# Patient Record
Sex: MALE | URBAN - METROPOLITAN AREA
[De-identification: names, ages, dates, MRNs, and addresses within clinical notes are randomized per-mention and may not be internally consistent; named-entity substitution may affect disease eponyms.]

---

## 2023-04-07 ENCOUNTER — APPOINTMENT (OUTPATIENT)
Dept: LAB | Facility: LAB | Age: 58
End: 2023-04-07
Payer: COMMERCIAL

## 2023-04-07 LAB
ALANINE AMINOTRANSFERASE (SGPT) (U/L) IN SER/PLAS: 33 U/L (ref 10–52)
ALBUMIN (G/DL) IN SER/PLAS: 4 G/DL (ref 3.4–5)
ALKALINE PHOSPHATASE (U/L) IN SER/PLAS: 50 U/L (ref 33–120)
AMORPHOUS CRYSTALS, URINE: NORMAL /HPF
ANION GAP IN SER/PLAS: 12 MMOL/L (ref 10–20)
ANTI-CENTROMERE: <0.2 AI
ANTI-CHROMATIN: <0.2 AI
ANTI-DNA (DS): <1 IU/ML
ANTI-JO-1 IGG: <0.2 AI
ANTI-RIBOSOMAL P: <0.2 AI
ANTI-RNP: <0.2 AI
ANTI-SCL-70: <0.2 AI
ANTI-SM/RNP: <0.2 AI
ANTI-SM: <0.2 AI
ANTI-SSA: <0.2 AI
ANTI-SSB: <0.2 AI
ASPARTATE AMINOTRANSFERASE (SGOT) (U/L) IN SER/PLAS: 23 U/L (ref 9–39)
BACTERIA, URINE: NORMAL /HPF
BASOPHILS (10*3/UL) IN BLOOD BY AUTOMATED COUNT: 0.03 X10E9/L (ref 0–0.1)
BASOPHILS/100 LEUKOCYTES IN BLOOD BY AUTOMATED COUNT: 0.3 % (ref 0–2)
BILIRUBIN TOTAL (MG/DL) IN SER/PLAS: 0.4 MG/DL (ref 0–1.2)
BROAD CASTS, URINE: NORMAL /LPF
BUDDING YEAST, URINE: NORMAL /HPF
CALCIUM (MG/DL) IN SER/PLAS: 8.9 MG/DL (ref 8.6–10.6)
CALCIUM CARBONATE CRYSTALS, URINE: NORMAL /HPF
CALCIUM OXALATE CRYSTALS, URINE: NORMAL /HPF
CALCIUM PHOSPHATE CRYSTALS, URINE: NORMAL /HPF
CARBON DIOXIDE, TOTAL (MMOL/L) IN SER/PLAS: 27 MMOL/L (ref 21–32)
CHLORIDE (MMOL/L) IN SER/PLAS: 103 MMOL/L (ref 98–107)
CREATININE (MG/DL) IN SER/PLAS: 0.83 MG/DL (ref 0.5–1.3)
CYSTINE CRYSTALS, URINE: NORMAL /HPF
EOSINOPHILS (10*3/UL) IN BLOOD BY AUTOMATED COUNT: 0 X10E9/L (ref 0–0.7)
EOSINOPHILS/100 LEUKOCYTES IN BLOOD BY AUTOMATED COUNT: 0 % (ref 0–6)
EPITHELIAL CASTS, URINE: NORMAL /LPF
ERYTHROCYTE DISTRIBUTION WIDTH (RATIO) BY AUTOMATED COUNT: 14.3 % (ref 11.5–14.5)
ERYTHROCYTE MEAN CORPUSCULAR HEMOGLOBIN CONCENTRATION (G/DL) BY AUTOMATED: 33.1 G/DL (ref 32–36)
ERYTHROCYTE MEAN CORPUSCULAR VOLUME (FL) BY AUTOMATED COUNT: 96 FL (ref 80–100)
ERYTHROCYTES (10*6/UL) IN BLOOD BY AUTOMATED COUNT: 4.39 X10E12/L (ref 4.5–5.9)
FAT, URINE: NORMAL /HPF
FATTY CASTS, URINE: NORMAL /LPF
FINE GRANULAR CASTS, URINE: NORMAL /LPF
GFR MALE: >90 ML/MIN/1.73M2
GLUCOSE (MG/DL) IN SER/PLAS: 139 MG/DL (ref 74–99)
HEMATOCRIT (%) IN BLOOD BY AUTOMATED COUNT: 42.3 % (ref 41–52)
HEMOGLOBIN (G/DL) IN BLOOD: 14 G/DL (ref 13.5–17.5)
HYALINE CASTS, URINE: NORMAL /LPF
IMMATURE GRANULOCYTES/100 LEUKOCYTES IN BLOOD BY AUTOMATED COUNT: 2.5 % (ref 0–0.9)
LEUCINE CRYSTALS, URINE: NORMAL /HPF
LEUKOCYTES (10*3/UL) IN BLOOD BY AUTOMATED COUNT: 11.9 X10E9/L (ref 4.4–11.3)
LYMPHOCYTES (10*3/UL) IN BLOOD BY AUTOMATED COUNT: 0.84 X10E9/L (ref 1.2–4.8)
LYMPHOCYTES/100 LEUKOCYTES IN BLOOD BY AUTOMATED COUNT: 7 % (ref 13–44)
MIXED CELLULAR CASTS, URINE: NORMAL /LPF
MONOCYTES (10*3/UL) IN BLOOD BY AUTOMATED COUNT: 0.39 X10E9/L (ref 0.1–1)
MONOCYTES/100 LEUKOCYTES IN BLOOD BY AUTOMATED COUNT: 3.3 % (ref 2–10)
MUCUS, URINE: NORMAL /LPF
NEUTROPHILS (10*3/UL) IN BLOOD BY AUTOMATED COUNT: 10.38 X10E9/L (ref 1.2–7.7)
NEUTROPHILS/100 LEUKOCYTES IN BLOOD BY AUTOMATED COUNT: 86.9 % (ref 40–80)
NRBC (PER 100 WBCS) BY AUTOMATED COUNT: 0 /100 WBC (ref 0–0)
OVAL FAT BODIES, URINE: NORMAL /HPF
PLATELETS (10*3/UL) IN BLOOD AUTOMATED COUNT: 359 X10E9/L (ref 150–450)
POTASSIUM (MMOL/L) IN SER/PLAS: 4.7 MMOL/L (ref 3.5–5.3)
PROTEIN TOTAL: 6.2 G/DL (ref 6.4–8.2)
RBC CASTS, URINE: NORMAL /LPF
RBC CLUMPS, URINE: NORMAL /HPF
RBC, URINE: NORMAL
RENAL EPITHELIAL CELLS, URINE: NORMAL /HPF
SODIUM (MMOL/L) IN SER/PLAS: 137 MMOL/L (ref 136–145)
SPERMATOZOA, URINE: NORMAL /HPF
SQUAMOUS EPITHELIAL CELLS, URINE: NORMAL /HPF
TRANSITIONAL EPITHELIAL CELLS, URINE: NORMAL /HPF
TRICHOMONAS, URINE: NORMAL /HPF
TRIPLE PHOSPHATE CRYSTALS, URINE: NORMAL /HPF
TYROSINE CRYSTALS, URINE: NORMAL /HPF
UREA NITROGEN (MG/DL) IN SER/PLAS: 17 MG/DL (ref 6–23)
URIC ACID (URATE) CRYSTALS, URINE: NORMAL /HPF
URINALYSIS MICROSCOPIC COMMENT: NORMAL
WAXY CASTS, URINE: NORMAL /LPF
WBC CASTS, URINE: NORMAL /LPF
WBC CLUMPS, URINE: NORMAL /HPF
WBC, URINE: NORMAL
YEAST HYPHAE, URINE: NORMAL /HPF

## 2024-01-18 ENCOUNTER — HOSPITAL ENCOUNTER (OUTPATIENT)
Facility: HOSPITAL | Age: 59
Setting detail: OBSERVATION
Discharge: NURSING, REHAB OR LONG-TERM CARE | End: 2024-01-28
Attending: STUDENT IN AN ORGANIZED HEALTH CARE EDUCATION/TRAINING PROGRAM | Admitting: INTERNAL MEDICINE
Payer: COMMERCIAL

## 2024-01-18 DIAGNOSIS — I10 PRIMARY HYPERTENSION: ICD-10-CM

## 2024-01-18 DIAGNOSIS — H04.129 DRY EYE: ICD-10-CM

## 2024-01-18 DIAGNOSIS — B86 SCABIES: ICD-10-CM

## 2024-01-18 DIAGNOSIS — L30.9 ECZEMA, UNSPECIFIED TYPE: Primary | ICD-10-CM

## 2024-01-18 DIAGNOSIS — L20.9 ATOPIC DERMATITIS AND RELATED CONDITION: ICD-10-CM

## 2024-01-18 DIAGNOSIS — M19.011 PRIMARY OSTEOARTHRITIS OF RIGHT SHOULDER: ICD-10-CM

## 2024-01-18 DIAGNOSIS — R62.7 FAILURE TO THRIVE IN ADULT: ICD-10-CM

## 2024-01-18 PROBLEM — S06.9XAA TRAUMATIC BRAIN INJURY (MULTI): Status: ACTIVE | Noted: 2024-01-18

## 2024-01-18 PROBLEM — K21.9 GERD (GASTROESOPHAGEAL REFLUX DISEASE): Status: ACTIVE | Noted: 2024-01-18

## 2024-01-18 PROBLEM — R68.89 GENERAL SYMPTOM: Status: ACTIVE | Noted: 2024-01-18

## 2024-01-18 PROBLEM — I65.29 CAROTID ATHEROSCLEROSIS: Status: ACTIVE | Noted: 2024-01-18

## 2024-01-18 PROBLEM — E78.5 HYPERLIPIDEMIA: Status: ACTIVE | Noted: 2024-01-18

## 2024-01-18 PROBLEM — J44.9 CHRONIC OBSTRUCTIVE PULMONARY DISEASE (MULTI): Status: ACTIVE | Noted: 2024-01-18

## 2024-01-18 PROBLEM — B00.52: Status: ACTIVE | Noted: 2017-12-13

## 2024-01-18 LAB
ALBUMIN SERPL BCP-MCNC: 4.5 G/DL (ref 3.4–5)
ALP SERPL-CCNC: 58 U/L (ref 33–136)
ALT SERPL W P-5'-P-CCNC: 21 U/L (ref 10–52)
ANION GAP SERPL CALC-SCNC: 12 MMOL/L (ref 10–20)
AST SERPL W P-5'-P-CCNC: 20 U/L (ref 9–39)
BASOPHILS # BLD AUTO: 0.09 X10*3/UL (ref 0–0.1)
BASOPHILS NFR BLD AUTO: 0.6 %
BILIRUB SERPL-MCNC: 0.5 MG/DL (ref 0–1.2)
BUN SERPL-MCNC: 17 MG/DL (ref 6–23)
CALCIUM SERPL-MCNC: 10.1 MG/DL (ref 8.6–10.3)
CHLORIDE SERPL-SCNC: 103 MMOL/L (ref 98–107)
CO2 SERPL-SCNC: 28 MMOL/L (ref 21–32)
CREAT SERPL-MCNC: 1.11 MG/DL (ref 0.5–1.3)
CRP SERPL-MCNC: 0.69 MG/DL
EGFRCR SERPLBLD CKD-EPI 2021: 76 ML/MIN/1.73M*2
EOSINOPHIL # BLD AUTO: 0.19 X10*3/UL (ref 0–0.7)
EOSINOPHIL NFR BLD AUTO: 1.4 %
ERYTHROCYTE [DISTWIDTH] IN BLOOD BY AUTOMATED COUNT: 12.7 % (ref 11.5–14.5)
ERYTHROCYTE [SEDIMENTATION RATE] IN BLOOD BY WESTERGREN METHOD: 10 MM/H (ref 0–20)
GLUCOSE SERPL-MCNC: 78 MG/DL (ref 74–99)
HCT VFR BLD AUTO: 46.5 % (ref 41–52)
HGB BLD-MCNC: 15.3 G/DL (ref 13.5–17.5)
IMM GRANULOCYTES # BLD AUTO: 0.08 X10*3/UL (ref 0–0.7)
IMM GRANULOCYTES NFR BLD AUTO: 0.6 % (ref 0–0.9)
LACTATE SERPL-SCNC: 1.3 MMOL/L (ref 0.4–2)
LYMPHOCYTES # BLD AUTO: 1.23 X10*3/UL (ref 1.2–4.8)
LYMPHOCYTES NFR BLD AUTO: 8.8 %
MCH RBC QN AUTO: 32.3 PG (ref 26–34)
MCHC RBC AUTO-ENTMCNC: 32.9 G/DL (ref 32–36)
MCV RBC AUTO: 98 FL (ref 80–100)
MONOCYTES # BLD AUTO: 1.23 X10*3/UL (ref 0.1–1)
MONOCYTES NFR BLD AUTO: 8.8 %
NEUTROPHILS # BLD AUTO: 11.17 X10*3/UL (ref 1.2–7.7)
NEUTROPHILS NFR BLD AUTO: 79.8 %
NRBC BLD-RTO: 0 /100 WBCS (ref 0–0)
PLATELET # BLD AUTO: 415 X10*3/UL (ref 150–450)
POTASSIUM SERPL-SCNC: 4.2 MMOL/L (ref 3.5–5.3)
PROT SERPL-MCNC: 7.3 G/DL (ref 6.4–8.2)
RBC # BLD AUTO: 4.74 X10*6/UL (ref 4.5–5.9)
SODIUM SERPL-SCNC: 139 MMOL/L (ref 136–145)
WBC # BLD AUTO: 14 X10*3/UL (ref 4.4–11.3)

## 2024-01-18 PROCEDURE — 86140 C-REACTIVE PROTEIN: CPT | Performed by: NURSE PRACTITIONER

## 2024-01-18 PROCEDURE — 83605 ASSAY OF LACTIC ACID: CPT | Performed by: NURSE PRACTITIONER

## 2024-01-18 PROCEDURE — 85025 COMPLETE CBC W/AUTO DIFF WBC: CPT | Performed by: NURSE PRACTITIONER

## 2024-01-18 PROCEDURE — G0378 HOSPITAL OBSERVATION PER HR: HCPCS

## 2024-01-18 PROCEDURE — 96374 THER/PROPH/DIAG INJ IV PUSH: CPT

## 2024-01-18 PROCEDURE — 2500000001 HC RX 250 WO HCPCS SELF ADMINISTERED DRUGS (ALT 637 FOR MEDICARE OP): Performed by: STUDENT IN AN ORGANIZED HEALTH CARE EDUCATION/TRAINING PROGRAM

## 2024-01-18 PROCEDURE — 36415 COLL VENOUS BLD VENIPUNCTURE: CPT | Performed by: NURSE PRACTITIONER

## 2024-01-18 PROCEDURE — 85652 RBC SED RATE AUTOMATED: CPT | Performed by: NURSE PRACTITIONER

## 2024-01-18 PROCEDURE — 99285 EMERGENCY DEPT VISIT HI MDM: CPT | Performed by: STUDENT IN AN ORGANIZED HEALTH CARE EDUCATION/TRAINING PROGRAM

## 2024-01-18 PROCEDURE — 2500000004 HC RX 250 GENERAL PHARMACY W/ HCPCS (ALT 636 FOR OP/ED): Performed by: STUDENT IN AN ORGANIZED HEALTH CARE EDUCATION/TRAINING PROGRAM

## 2024-01-18 PROCEDURE — 80053 COMPREHEN METABOLIC PANEL: CPT | Performed by: NURSE PRACTITIONER

## 2024-01-18 RX ORDER — MOMETASONE FUROATE AND FORMOTEROL FUMARATE DIHYDRATE 200; 5 UG/1; UG/1
2 AEROSOL RESPIRATORY (INHALATION)
COMMUNITY

## 2024-01-18 RX ORDER — FERROUS SULFATE, DRIED 160(50) MG
1 TABLET, EXTENDED RELEASE ORAL DAILY
COMMUNITY

## 2024-01-18 RX ORDER — PANTOPRAZOLE SODIUM 40 MG/1
20 TABLET, DELAYED RELEASE ORAL
COMMUNITY

## 2024-01-18 RX ORDER — LORATADINE 10 MG/1
10 TABLET ORAL DAILY
COMMUNITY

## 2024-01-18 RX ORDER — ALPRAZOLAM 0.5 MG/1
0.5 TABLET, EXTENDED RELEASE ORAL EVERY MORNING
COMMUNITY

## 2024-01-18 RX ORDER — METOPROLOL TARTRATE 1 MG/ML
5 INJECTION, SOLUTION INTRAVENOUS ONCE
Status: COMPLETED | OUTPATIENT
Start: 2024-01-19 | End: 2024-01-19

## 2024-01-18 RX ORDER — VALACYCLOVIR HYDROCHLORIDE 500 MG/1
500 TABLET, FILM COATED ORAL 2 TIMES DAILY
COMMUNITY
End: 2024-01-28 | Stop reason: HOSPADM

## 2024-01-18 RX ORDER — PREDNISOLONE ACETATE 10 MG/ML
1 SUSPENSION/ DROPS OPHTHALMIC 4 TIMES DAILY
COMMUNITY

## 2024-01-18 RX ORDER — ACETAMINOPHEN 325 MG/1
650 TABLET ORAL EVERY 4 HOURS PRN
Status: DISCONTINUED | OUTPATIENT
Start: 2024-01-18 | End: 2024-01-28 | Stop reason: HOSPADM

## 2024-01-18 RX ORDER — LEVALBUTEROL TARTRATE 45 UG/1
1-2 AEROSOL, METERED ORAL EVERY 6 HOURS PRN
COMMUNITY
End: 2024-01-28 | Stop reason: HOSPADM

## 2024-01-18 RX ORDER — DIPHENHYDRAMINE HYDROCHLORIDE 50 MG/ML
50 INJECTION INTRAMUSCULAR; INTRAVENOUS ONCE
Status: COMPLETED | OUTPATIENT
Start: 2024-01-18 | End: 2024-01-18

## 2024-01-18 RX ORDER — DIPHENHYDRAMINE HYDROCHLORIDE 50 MG/ML
50 INJECTION INTRAMUSCULAR; INTRAVENOUS ONCE
Status: COMPLETED | OUTPATIENT
Start: 2024-01-19 | End: 2024-01-19

## 2024-01-18 RX ORDER — PREDNISONE 20 MG/1
60 TABLET ORAL DAILY
Status: DISCONTINUED | OUTPATIENT
Start: 2024-01-18 | End: 2024-01-19

## 2024-01-18 RX ORDER — OXYCODONE AND ACETAMINOPHEN 5; 325 MG/1; MG/1
1 TABLET ORAL EVERY 8 HOURS PRN
COMMUNITY

## 2024-01-18 RX ORDER — LOSARTAN POTASSIUM 50 MG/1
100 TABLET ORAL DAILY
COMMUNITY

## 2024-01-18 RX ADMIN — PREDNISONE 60 MG: 20 TABLET ORAL at 19:25

## 2024-01-18 RX ADMIN — POLYVINYL ALCOHOL, POVIDONE 2 DROP: 14; 6 SOLUTION/ DROPS OPHTHALMIC at 19:25

## 2024-01-18 RX ADMIN — DIPHENHYDRAMINE HYDROCHLORIDE 50 MG: 50 INJECTION, SOLUTION INTRAMUSCULAR; INTRAVENOUS at 18:21

## 2024-01-18 ASSESSMENT — PAIN DESCRIPTION - DESCRIPTORS: DESCRIPTORS: ACHING

## 2024-01-18 ASSESSMENT — LIFESTYLE VARIABLES
REASON UNABLE TO ASSESS: NO
HAVE PEOPLE ANNOYED YOU BY CRITICIZING YOUR DRINKING: NO
EVER FELT BAD OR GUILTY ABOUT YOUR DRINKING: NO
EVER HAD A DRINK FIRST THING IN THE MORNING TO STEADY YOUR NERVES TO GET RID OF A HANGOVER: NO
HAVE YOU EVER FELT YOU SHOULD CUT DOWN ON YOUR DRINKING: NO

## 2024-01-18 ASSESSMENT — PAIN DESCRIPTION - LOCATION: LOCATION: GENERALIZED

## 2024-01-18 ASSESSMENT — PAIN SCALES - GENERAL
PAINLEVEL_OUTOF10: 8
PAINLEVEL_OUTOF10: 8

## 2024-01-18 ASSESSMENT — PAIN DESCRIPTION - PAIN TYPE: TYPE: ACUTE PAIN;CHRONIC PAIN

## 2024-01-18 ASSESSMENT — PAIN DESCRIPTION - FREQUENCY: FREQUENCY: CONSTANT/CONTINUOUS

## 2024-01-18 ASSESSMENT — PAIN - FUNCTIONAL ASSESSMENT: PAIN_FUNCTIONAL_ASSESSMENT: 0-10

## 2024-01-18 NOTE — ED NOTES
"The pt is Alert and oriented x3, resp easy and regular. The pt c/o body rash with weakness and stated inability to care for himself. The body rash has been going on for years and worsening recently. The states, \"I had a friend drive me up from [place], [place] for evaluation. Several years ago Bina transferred me up to [facility] system for the same. I feel Bina doesn't know how to treat skin issues.\"     Susan Shahid RN  01/18/24 1813    "

## 2024-01-18 NOTE — ED TRIAGE NOTES
Secondary to patient volumes and overcrowding, I performed a brief medical screening exam of the patient in triage, as the patient awaits space in the main ED.    History of Present Illness:  Raul uJles presents with   Chief Complaint   Patient presents with    Rash       Physical Exam:  General - In no acute distress  Respiratory - Breathing comfortably  Neurologic - Moving all extremities  Derm -erythematous rash with xerosis to face, hands, and feet.    Medical Decision Making:  Patient will require further evaluation in the main ED.    Initial diagnostic tests were ordered from triage.    The patient demonstrates understanding that this initial evaluation is a brief medical screening exam and the expectation is that they await for space in the main ED to be further evaluated.  The patient understands that, if they leave prior to further evaluation in the main ED after this initial evaluation in triage, they are doing so under their own accord knowing that their evaluation/work-up is not yet complete. The patient also understands that any preliminary diagnostic results, including abnormalities, may not be shared with them, if they choose to leave prior to further evaluation in the main ED.

## 2024-01-19 LAB
ANION GAP SERPL CALC-SCNC: 13 MMOL/L (ref 10–20)
APPEARANCE UR: CLEAR
BILIRUB UR STRIP.AUTO-MCNC: NEGATIVE MG/DL
BUN SERPL-MCNC: 20 MG/DL (ref 6–23)
CALCIUM SERPL-MCNC: 9.2 MG/DL (ref 8.6–10.3)
CHLORIDE SERPL-SCNC: 107 MMOL/L (ref 98–107)
CO2 SERPL-SCNC: 24 MMOL/L (ref 21–32)
COLOR UR: YELLOW
CREAT SERPL-MCNC: 0.75 MG/DL (ref 0.5–1.3)
EGFRCR SERPLBLD CKD-EPI 2021: >90 ML/MIN/1.73M*2
ERYTHROCYTE [DISTWIDTH] IN BLOOD BY AUTOMATED COUNT: 12.8 % (ref 11.5–14.5)
GLUCOSE SERPL-MCNC: 103 MG/DL (ref 74–99)
GLUCOSE UR STRIP.AUTO-MCNC: NEGATIVE MG/DL
HCT VFR BLD AUTO: 41.1 % (ref 41–52)
HGB BLD-MCNC: 13.4 G/DL (ref 13.5–17.5)
HOLD SPECIMEN: NORMAL
KETONES UR STRIP.AUTO-MCNC: NEGATIVE MG/DL
LEUKOCYTE ESTERASE UR QL STRIP.AUTO: NEGATIVE
MCH RBC QN AUTO: 32.3 PG (ref 26–34)
MCHC RBC AUTO-ENTMCNC: 32.6 G/DL (ref 32–36)
MCV RBC AUTO: 99 FL (ref 80–100)
NITRITE UR QL STRIP.AUTO: NEGATIVE
NRBC BLD-RTO: 0 /100 WBCS (ref 0–0)
PH UR STRIP.AUTO: 5 [PH]
PLATELET # BLD AUTO: 389 X10*3/UL (ref 150–450)
POTASSIUM SERPL-SCNC: 4.5 MMOL/L (ref 3.5–5.3)
PROT UR STRIP.AUTO-MCNC: NEGATIVE MG/DL
RBC # BLD AUTO: 4.15 X10*6/UL (ref 4.5–5.9)
RBC # UR STRIP.AUTO: NEGATIVE /UL
SODIUM SERPL-SCNC: 139 MMOL/L (ref 136–145)
SP GR UR STRIP.AUTO: 1.02
UROBILINOGEN UR STRIP.AUTO-MCNC: <2 MG/DL
WBC # BLD AUTO: 8 X10*3/UL (ref 4.4–11.3)

## 2024-01-19 PROCEDURE — 81003 URINALYSIS AUTO W/O SCOPE: CPT | Performed by: NURSE PRACTITIONER

## 2024-01-19 PROCEDURE — 2500000001 HC RX 250 WO HCPCS SELF ADMINISTERED DRUGS (ALT 637 FOR MEDICARE OP): Performed by: INTERNAL MEDICINE

## 2024-01-19 PROCEDURE — 2500000001 HC RX 250 WO HCPCS SELF ADMINISTERED DRUGS (ALT 637 FOR MEDICARE OP): Performed by: NURSE PRACTITIONER

## 2024-01-19 PROCEDURE — 2500000005 HC RX 250 GENERAL PHARMACY W/O HCPCS: Performed by: NURSE PRACTITIONER

## 2024-01-19 PROCEDURE — 85027 COMPLETE CBC AUTOMATED: CPT | Performed by: NURSE PRACTITIONER

## 2024-01-19 PROCEDURE — 96375 TX/PRO/DX INJ NEW DRUG ADDON: CPT

## 2024-01-19 PROCEDURE — 97165 OT EVAL LOW COMPLEX 30 MIN: CPT | Mod: GO

## 2024-01-19 PROCEDURE — 94640 AIRWAY INHALATION TREATMENT: CPT

## 2024-01-19 PROCEDURE — 36415 COLL VENOUS BLD VENIPUNCTURE: CPT | Performed by: INTERNAL MEDICINE

## 2024-01-19 PROCEDURE — 2500000001 HC RX 250 WO HCPCS SELF ADMINISTERED DRUGS (ALT 637 FOR MEDICARE OP): Performed by: STUDENT IN AN ORGANIZED HEALTH CARE EDUCATION/TRAINING PROGRAM

## 2024-01-19 PROCEDURE — 99222 1ST HOSP IP/OBS MODERATE 55: CPT | Performed by: NURSE PRACTITIONER

## 2024-01-19 PROCEDURE — 80048 BASIC METABOLIC PNL TOTAL CA: CPT | Performed by: NURSE PRACTITIONER

## 2024-01-19 PROCEDURE — 2500000002 HC RX 250 W HCPCS SELF ADMINISTERED DRUGS (ALT 637 FOR MEDICARE OP, ALT 636 FOR OP/ED): Performed by: NURSE PRACTITIONER

## 2024-01-19 PROCEDURE — 99223 1ST HOSP IP/OBS HIGH 75: CPT | Performed by: INTERNAL MEDICINE

## 2024-01-19 PROCEDURE — 87389 HIV-1 AG W/HIV-1&-2 AB AG IA: CPT | Mod: PARLAB | Performed by: INTERNAL MEDICINE

## 2024-01-19 PROCEDURE — 97162 PT EVAL MOD COMPLEX 30 MIN: CPT | Mod: GP

## 2024-01-19 PROCEDURE — 96376 TX/PRO/DX INJ SAME DRUG ADON: CPT

## 2024-01-19 PROCEDURE — G0378 HOSPITAL OBSERVATION PER HR: HCPCS

## 2024-01-19 PROCEDURE — 36415 COLL VENOUS BLD VENIPUNCTURE: CPT | Performed by: NURSE PRACTITIONER

## 2024-01-19 PROCEDURE — 2500000004 HC RX 250 GENERAL PHARMACY W/ HCPCS (ALT 636 FOR OP/ED): Performed by: STUDENT IN AN ORGANIZED HEALTH CARE EDUCATION/TRAINING PROGRAM

## 2024-01-19 PROCEDURE — 2500000004 HC RX 250 GENERAL PHARMACY W/ HCPCS (ALT 636 FOR OP/ED): Performed by: NURSE PRACTITIONER

## 2024-01-19 RX ORDER — DIPHENHYDRAMINE HYDROCHLORIDE 50 MG/ML
25 INJECTION INTRAMUSCULAR; INTRAVENOUS EVERY 6 HOURS PRN
Status: DISCONTINUED | OUTPATIENT
Start: 2024-01-19 | End: 2024-01-28 | Stop reason: HOSPADM

## 2024-01-19 RX ORDER — FERROUS SULFATE, DRIED 160(50) MG
1 TABLET, EXTENDED RELEASE ORAL DAILY
Status: DISCONTINUED | OUTPATIENT
Start: 2024-01-19 | End: 2024-01-28 | Stop reason: HOSPADM

## 2024-01-19 RX ORDER — OXYCODONE AND ACETAMINOPHEN 5; 325 MG/1; MG/1
1 TABLET ORAL EVERY 8 HOURS PRN
Status: DISCONTINUED | OUTPATIENT
Start: 2024-01-19 | End: 2024-01-28 | Stop reason: HOSPADM

## 2024-01-19 RX ORDER — PANTOPRAZOLE SODIUM 20 MG/1
20 TABLET, DELAYED RELEASE ORAL
Status: DISCONTINUED | OUTPATIENT
Start: 2024-01-19 | End: 2024-01-28 | Stop reason: HOSPADM

## 2024-01-19 RX ORDER — SODIUM CHLORIDE 9 MG/ML
50 INJECTION, SOLUTION INTRAVENOUS CONTINUOUS
Status: ACTIVE | OUTPATIENT
Start: 2024-01-19 | End: 2024-01-19

## 2024-01-19 RX ORDER — PERMETHRIN 50 MG/G
CREAM TOPICAL ONCE
Status: COMPLETED | OUTPATIENT
Start: 2024-01-19 | End: 2024-01-19

## 2024-01-19 RX ORDER — ALPRAZOLAM 0.25 MG/1
0.5 TABLET ORAL DAILY
Status: DISCONTINUED | OUTPATIENT
Start: 2024-01-19 | End: 2024-01-28 | Stop reason: HOSPADM

## 2024-01-19 RX ORDER — FLUTICASONE FUROATE AND VILANTEROL 200; 25 UG/1; UG/1
1 POWDER RESPIRATORY (INHALATION)
Status: DISCONTINUED | OUTPATIENT
Start: 2024-01-19 | End: 2024-01-28 | Stop reason: HOSPADM

## 2024-01-19 RX ORDER — PREDNISOLONE ACETATE 10 MG/ML
1 SUSPENSION/ DROPS OPHTHALMIC 4 TIMES DAILY
Status: DISCONTINUED | OUTPATIENT
Start: 2024-01-19 | End: 2024-01-28 | Stop reason: HOSPADM

## 2024-01-19 RX ORDER — PREDNISONE 20 MG/1
40 TABLET ORAL DAILY
Status: COMPLETED | OUTPATIENT
Start: 2024-01-20 | End: 2024-01-20

## 2024-01-19 RX ORDER — AMLODIPINE BESYLATE 5 MG/1
5 TABLET ORAL DAILY
Status: DISCONTINUED | OUTPATIENT
Start: 2024-01-19 | End: 2024-01-20

## 2024-01-19 RX ORDER — LORATADINE 10 MG/1
10 TABLET ORAL DAILY
Status: DISCONTINUED | OUTPATIENT
Start: 2024-01-19 | End: 2024-01-28 | Stop reason: HOSPADM

## 2024-01-19 RX ORDER — LOSARTAN POTASSIUM 50 MG/1
100 TABLET ORAL DAILY
Status: DISCONTINUED | OUTPATIENT
Start: 2024-01-19 | End: 2024-01-28 | Stop reason: HOSPADM

## 2024-01-19 RX ADMIN — Medication: at 17:27

## 2024-01-19 RX ADMIN — LOSARTAN POTASSIUM 100 MG: 50 TABLET, FILM COATED ORAL at 10:06

## 2024-01-19 RX ADMIN — Medication 1 TABLET: at 10:06

## 2024-01-19 RX ADMIN — PREDNISONE 60 MG: 20 TABLET ORAL at 10:07

## 2024-01-19 RX ADMIN — PREDNISOLONE ACETATE 1 DROP: 10 SUSPENSION/ DROPS OPHTHALMIC at 07:00

## 2024-01-19 RX ADMIN — METOPROLOL TARTRATE 5 MG: 5 INJECTION INTRAVENOUS at 00:17

## 2024-01-19 RX ADMIN — POLYVINYL ALCOHOL, POVIDONE 2 DROP: 14; 6 SOLUTION/ DROPS OPHTHALMIC at 10:06

## 2024-01-19 RX ADMIN — ACETAMINOPHEN 650 MG: 325 TABLET ORAL at 00:17

## 2024-01-19 RX ADMIN — LORATADINE 10 MG: 10 TABLET ORAL at 10:07

## 2024-01-19 RX ADMIN — PERMETHRIN: 50 CREAM TOPICAL at 17:27

## 2024-01-19 RX ADMIN — ALPRAZOLAM 0.5 MG: 0.25 TABLET ORAL at 10:07

## 2024-01-19 RX ADMIN — PREDNISOLONE ACETATE 1 DROP: 10 SUSPENSION/ DROPS OPHTHALMIC at 21:00

## 2024-01-19 RX ADMIN — SODIUM CHLORIDE 50 ML/HR: 9 INJECTION, SOLUTION INTRAVENOUS at 00:19

## 2024-01-19 RX ADMIN — OXYCODONE HYDROCHLORIDE AND ACETAMINOPHEN 1 TABLET: 5; 325 TABLET ORAL at 09:58

## 2024-01-19 RX ADMIN — OXYCODONE HYDROCHLORIDE AND ACETAMINOPHEN 1 TABLET: 5; 325 TABLET ORAL at 23:29

## 2024-01-19 RX ADMIN — ACETAMINOPHEN 650 MG: 325 TABLET ORAL at 06:14

## 2024-01-19 RX ADMIN — OXYCODONE HYDROCHLORIDE AND ACETAMINOPHEN 1 TABLET: 5; 325 TABLET ORAL at 00:17

## 2024-01-19 RX ADMIN — FLUTICASONE FUROATE AND VILANTEROL TRIFENATATE 1 PUFF: 200; 25 POWDER RESPIRATORY (INHALATION) at 07:02

## 2024-01-19 RX ADMIN — DIPHENHYDRAMINE HYDROCHLORIDE 50 MG: 50 INJECTION, SOLUTION INTRAMUSCULAR; INTRAVENOUS at 00:17

## 2024-01-19 RX ADMIN — PREDNISOLONE ACETATE 1 DROP: 10 SUSPENSION/ DROPS OPHTHALMIC at 17:27

## 2024-01-19 RX ADMIN — PANTOPRAZOLE SODIUM 20 MG: 20 TABLET, DELAYED RELEASE ORAL at 06:13

## 2024-01-19 RX ADMIN — POLYVINYL ALCOHOL, POVIDONE 2 DROP: 14; 6 SOLUTION/ DROPS OPHTHALMIC at 00:18

## 2024-01-19 SDOH — SOCIAL STABILITY: SOCIAL INSECURITY: DOES ANYONE TRY TO KEEP YOU FROM HAVING/CONTACTING OTHER FRIENDS OR DOING THINGS OUTSIDE YOUR HOME?: NO

## 2024-01-19 SDOH — SOCIAL STABILITY: SOCIAL INSECURITY: ABUSE: ADULT

## 2024-01-19 SDOH — SOCIAL STABILITY: SOCIAL INSECURITY: DO YOU FEEL UNSAFE GOING BACK TO THE PLACE WHERE YOU ARE LIVING?: NO

## 2024-01-19 SDOH — SOCIAL STABILITY: SOCIAL INSECURITY: ARE YOU OR HAVE YOU BEEN THREATENED OR ABUSED PHYSICALLY, EMOTIONALLY, OR SEXUALLY BY ANYONE?: NO

## 2024-01-19 SDOH — SOCIAL STABILITY: SOCIAL INSECURITY: HAS ANYONE EVER THREATENED TO HURT YOUR FAMILY OR YOUR PETS?: NO

## 2024-01-19 SDOH — SOCIAL STABILITY: SOCIAL INSECURITY: HAVE YOU HAD THOUGHTS OF HARMING ANYONE ELSE?: NO

## 2024-01-19 SDOH — SOCIAL STABILITY: SOCIAL INSECURITY: WERE YOU ABLE TO COMPLETE ALL THE BEHAVIORAL HEALTH SCREENINGS?: YES

## 2024-01-19 SDOH — SOCIAL STABILITY: SOCIAL INSECURITY: ARE THERE ANY APPARENT SIGNS OF INJURIES/BEHAVIORS THAT COULD BE RELATED TO ABUSE/NEGLECT?: NO

## 2024-01-19 SDOH — SOCIAL STABILITY: SOCIAL INSECURITY: DO YOU FEEL ANYONE HAS EXPLOITED OR TAKEN ADVANTAGE OF YOU FINANCIALLY OR OF YOUR PERSONAL PROPERTY?: NO

## 2024-01-19 ASSESSMENT — COGNITIVE AND FUNCTIONAL STATUS - GENERAL
MOVING TO AND FROM BED TO CHAIR: A LOT
EATING MEALS: A LITTLE
DAILY ACTIVITIY SCORE: 20
TOILETING: A LITTLE
HELP NEEDED FOR BATHING: A LITTLE
DRESSING REGULAR UPPER BODY CLOTHING: A LITTLE
DRESSING REGULAR UPPER BODY CLOTHING: A LOT
HELP NEEDED FOR BATHING: A LOT
WALKING IN HOSPITAL ROOM: TOTAL
PERSONAL GROOMING: A LITTLE
MOBILITY SCORE: 24
STANDING UP FROM CHAIR USING ARMS: A LOT
DRESSING REGULAR LOWER BODY CLOTHING: A LOT
MOVING FROM LYING ON BACK TO SITTING ON SIDE OF FLAT BED WITH BEDRAILS: A LITTLE
DRESSING REGULAR LOWER BODY CLOTHING: A LITTLE
CLIMB 3 TO 5 STEPS WITH RAILING: TOTAL
PATIENT BASELINE BEDBOUND: NO
MOBILITY SCORE: 12
DAILY ACTIVITIY SCORE: 15
TURNING FROM BACK TO SIDE WHILE IN FLAT BAD: A LITTLE
PERSONAL GROOMING: A LITTLE

## 2024-01-19 ASSESSMENT — ACTIVITIES OF DAILY LIVING (ADL)
JUDGMENT_ADEQUATE_SAFELY_COMPLETE_DAILY_ACTIVITIES: YES
BATHING: INDEPENDENT
WALKS IN HOME: INDEPENDENT
PATIENT'S MEMORY ADEQUATE TO SAFELY COMPLETE DAILY ACTIVITIES?: NO
TOILETING: INDEPENDENT
LACK_OF_TRANSPORTATION: PATIENT DECLINED
GROOMING: NEEDS ASSISTANCE
FEEDING YOURSELF: INDEPENDENT
HEARING - LEFT EAR: DIFFICULTY WITH NOISE
DRESSING YOURSELF: NEEDS ASSISTANCE
ADEQUATE_TO_COMPLETE_ADL: YES
HEARING - RIGHT EAR: DIFFICULTY WITH NOISE

## 2024-01-19 ASSESSMENT — PATIENT HEALTH QUESTIONNAIRE - PHQ9
SUM OF ALL RESPONSES TO PHQ9 QUESTIONS 1 & 2: 0
1. LITTLE INTEREST OR PLEASURE IN DOING THINGS: NOT AT ALL
2. FEELING DOWN, DEPRESSED OR HOPELESS: NOT AT ALL

## 2024-01-19 ASSESSMENT — PAIN SCALES - GENERAL
PAINLEVEL_OUTOF10: 0 - NO PAIN
PAINLEVEL_OUTOF10: 7
PAINLEVEL_OUTOF10: 8
PAINLEVEL_OUTOF10: 8
PAINLEVEL_OUTOF10: 0 - NO PAIN

## 2024-01-19 ASSESSMENT — LIFESTYLE VARIABLES
AUDIT-C TOTAL SCORE: 1
SKIP TO QUESTIONS 9-10: 1
HOW MANY STANDARD DRINKS CONTAINING ALCOHOL DO YOU HAVE ON A TYPICAL DAY: 1 OR 2
AUDIT-C TOTAL SCORE: 1
HOW OFTEN DO YOU HAVE A DRINK CONTAINING ALCOHOL: MONTHLY OR LESS
HOW OFTEN DO YOU HAVE 6 OR MORE DRINKS ON ONE OCCASION: NEVER

## 2024-01-19 ASSESSMENT — COLUMBIA-SUICIDE SEVERITY RATING SCALE - C-SSRS
6. HAVE YOU EVER DONE ANYTHING, STARTED TO DO ANYTHING, OR PREPARED TO DO ANYTHING TO END YOUR LIFE?: NO
1. IN THE PAST MONTH, HAVE YOU WISHED YOU WERE DEAD OR WISHED YOU COULD GO TO SLEEP AND NOT WAKE UP?: NO
2. HAVE YOU ACTUALLY HAD ANY THOUGHTS OF KILLING YOURSELF?: NO

## 2024-01-19 ASSESSMENT — PAIN - FUNCTIONAL ASSESSMENT
PAIN_FUNCTIONAL_ASSESSMENT: 0-10
PAIN_FUNCTIONAL_ASSESSMENT: 0-10

## 2024-01-19 NOTE — CARE PLAN
The patient's goals for the shift include      The clinical goals for the shift include to be less itchy    Over the shift, the patient did not make progress toward the following goals. Barriers to progression include . Recommendations to address these barriers include .

## 2024-01-19 NOTE — ED PROVIDER NOTES
CC: Rash     HPI:  Raul Jules is a 58 y.o. male with a past medical history of anxiety, TBI, COPD, eczema, GERD, HSV of the eye, hyperlipidemia, hypertension, presenting to the emergency department today due to a rash.  Patient states that this rash feels very similar to a rash that he has had several years prior.  He states that several years prior, he went to [facility] and was admitted for severe atopic dermatitis.  He states that he was started on several medications at that time and eventually was discharged home.  He has not had a recurrent episode of that in several years.  He does not recall with the medications.  Has never had to have them refilled.  He has lost contact with dermatology since.  He states that over the past several weeks he has been having worsening episodes of this rash.  He is gone to multiple doctors however states that they do not feel comfortable with dermatology.  He states that he presented here to see a dermatologist.  He also endorses blurry vision.  He does have a ulcer in his eye however he states that this is worse than his normal.  He was recently seen by ophthalmology who did a full exam and diagnosed him with dry eyes.    Limitations to History: none  Additional History provided by: N/A    External Records Reviewed:  Recent available ED and inpatient notes reviewed in EMR.    PMHx/PSHx:  Per HPI.   - has no past medical history on file.  - has no past surgical history on file.    Medications:  Reviewed in EMR. See EMR for complete list of medications and doses.    Allergies:  Patient has no known allergies.    Social History:  - Tobacco:  has no history on file for tobacco use.   - Alcohol:  has no history on file for alcohol use.   - Illicit Drugs:  has no history on file for drug use.     ROS:  Per HPI.     ???????????????????????????????????????????????????????????????  Triage Vitals:  T 36 °C (96.8 °F)  HR 86  /89  RR 18  O2 98 % None (Room air)    Physical  Exam  Vitals and nursing note reviewed.   Constitutional:       General: He is not in acute distress.     Appearance: He is well-developed.   HENT:      Head: Normocephalic and atraumatic.   Eyes:      Conjunctiva/sclera: Conjunctivae normal.   Cardiovascular:      Rate and Rhythm: Normal rate and regular rhythm.      Heart sounds: No murmur heard.  Pulmonary:      Effort: Pulmonary effort is normal. No respiratory distress.      Breath sounds: Normal breath sounds.   Abdominal:      Palpations: Abdomen is soft.      Tenderness: There is no abdominal tenderness.   Musculoskeletal:         General: No swelling.      Cervical back: Neck supple.   Skin:     General: Skin is warm and dry.      Capillary Refill: Capillary refill takes less than 2 seconds.      Findings: Rash present. Rash is scaling.      Comments: Redness, cracking, dry rash throughout his face, neck, chest, flexor surfaces of the elbow, hands, flexor surfaces of the knees, and feet, multiple areas where patient has scratched and caused some mild bleeding.   Neurological:      Mental Status: He is alert.   Psychiatric:         Mood and Affect: Mood normal.       ???????????????????????????????????????????????????????????????  ED Course:  Diagnoses as of 01/18/24 2241   Eczema, unspecified type   Failure to thrive in adult       EKG & Images:  Independently reviewed, See ED Course      MDM:  -The patient is a 58-year-old male with multiple medical problems coming into the emergency department due to concerns for rash.  He does have a history of eczema that was quite severe in the past requiring dermatology evaluation.  He is also follow-up with dermatology since.  He has not had an acute exacerbation in several years.  Today, he is coming in for due to several weeks of worsening rash in his body, his face, hands, chest, feet.  On exam, he has severe dryness, cracking, and is scratching throughout.  It does appear to be consistent with severe atopic  dermatitis.  Given how diffuse the rashes, opted for systemic therapy with a prednisone taper.  At this time, there are no signs of infection, his lab work is largely unremarkable, do not feel that he requires antibiotics or emergent transfer for emergent dermatology evaluation.  However, patient would benefit from urgent visit with dermatology outpatient.  As such, contacted the dermatology team and was able to facilitate appointment within the next 1 to 2 weeks.  Patient does have some blurry vision in both eyes, however this is consistent with his underlying diagnosis of dry eye.  He was prescribed lubricating eyedrops.  Given that he lives at home alone and does not feel safe caring for himself at home, he will be admitted to medicine for further management.     Final diagnoses:   None         Social Determinants Limiting Care:  Transportation difficulties and Poor health literacy    Disposition:  Discharge    Melina Vegas MD   Emergency Medicine Resident, PGY3  [facility]     Disclaimer: This note was dictated by speech recognition. Minor errors in transcription may be present    Procedures ? [employer] last updated 1/18/2024 7:15 PM        Melina Vegas MD  Resident  01/18/24 2280

## 2024-01-19 NOTE — PROGRESS NOTES
Occupational Therapy    Evaluation    Patient Name: Raul Jules  MRN: [id number]  Today's Date: 1/19/2024  Time Calculation  Start Time: 1037  Stop Time: 1059  Time Calculation (min): 22 min        Assessment:  Prognosis: Good  End of Session Patient Position: Bed, 2 rail up, Alarm on (call light in reach)  OT Assessment Results: Decreased ADL status, Decreased upper extremity range of motion, Decreased upper extremity strength, Decreased safe judgment during ADL, Decreased endurance, Decreased fine motor control, Decreased functional mobility, Decreased trunk control for functional activities    Plan:  Treatment Interventions: ADL retraining, Functional transfer training, UE strengthening/ROM, Endurance training, Neuromuscular reeducation, Fine motor coordination activities, Compensatory technique education  OT Frequency: 3 times per week  OT Discharge Recommendations: Moderate intensity level of continued care  OT - OK to Discharge: Yes (from an O.T. standpoint)      Subjective   Current Problem:  1. Eczema, unspecified type        2. Failure to thrive in adult          General:  General  Reason for Referral: OT eval & treat for ADLs/safety (Eczema, failure to thrive)  Referred By: Marichuy Fierro  Past Medical History Relevant to Rehab: 1/18/24: rash, worsening, blurry vision, difficulty caring for self at home (PMH: anxiety, COPD, GERD, HTN, hyperlipidemia, TBI, HSV of eye, eczema)  Prior to Session Communication: Bedside nurse  Patient Position Received: Bed, 2 rail up, Alarm on  General Comment: poor skin integrity, dry, scaly, flaky, cracked skin all over    Precautions:  Precautions Comment: fall/safety, bed alarm     Pain:  Pain Assessment  Pain Assessment:  (pain R shoulder and B hands, not rated)    Objective   Cognition:  Overall Cognitive Status:  (A & O x 3; tangental, mod cues to remain on task/topic, cues for safety/hand placement)      Home Living:  Lives With: Alone  Home Adaptive Equipment: Cane,  "Walker rolling or standard  Home Layout: One level  Bathroom Shower/Tub: Walk-in shower  Bathroom Toilet: Handicapped height    Prior Function:  Level of [place]: Independent with ADLs and functional transfers, Independent with homemaking with ambulation     ADL:  Grooming Assistance: Minimal  UE Dressing Assistance: Maximal  LE Dressing Assistance: Maximal  ADL Comments: Extensive assist all ADLs secondary to pain and impaired ROM BUE    Activity Tolerance:  Endurance: Decreased tolerance for upright activites (Reports dizziness in standing)    Bed Mobility/Transfers: Bed Mobility  Bed Mobility:  (SBA supine <-> sit)  Transfers  Transfer:  (mod assist sit to stand from edge of bed)      Ambulation/Gait Training:  Functional mobility: mod assist for balance/safety to take 3-4 lateral steps with wheeled walker    Sitting Balance:  Static Sitting Balance  Static Sitting-Balance Support:  (supervision)     Vision:Vision - Basic Assessment  Current Vision:  (Impaired vision, \"foggy\" vision. Eyes are red)     Sensation:  Sensation Comment: denies numbness/tingling    Coordination:  Coordination Comment: impaired FMC secondary to poor skin integrity and pain B hands      Extremities:   RUE :  (Patient reports chronic limitation R shoulder secondary to rotator cuff tear.  AROM 20 degrees shoulder flexion, 1/2 range elbow flexion/extension, 1/2 range wrist/hand ROM.  MMT grossly 2-/5)   LUE:  (LUE AROM grossly 75 degrees shoulder flexion, 1/2 range elbow flexion/extension, 1/2 range wrist/hand ROM.  MMT grossly 2-/5)        Outcome Measures:[facility] Daily Activity  Putting on and taking off regular lower body clothing: A lot  Bathing (including washing, rinsing, drying): A lot  Putting on and taking off regular upper body clothing: A lot  Toileting, which includes using toilet, bedpan or urinal: A little  Taking care of personal grooming such as brushing teeth: A little  Eating Meals: A little  Daily Activity - Total " Score: 15        Education Documentation  ADL Training, taught by Karina Peralta OT at 1/19/2024 11:25 AM.  Learner: Patient  Readiness: Acceptance  Method: Explanation  Response: Verbalizes Understanding, Needs Reinforcement         Goals:  Encounter Problems       Encounter Problems (Active)       OT Goals       Tolerate 10-15 minutes UE therex within ROM limitations to promote increased activity tolerance for ADLs       Start:  01/19/24    Expected End:  02/02/24            Increase grooming & UE dressing to SBA        Start:  01/19/24    Expected End:  02/02/24            Increase LE dressing to min assist with adaptive equipment prn       Start:  01/19/24    Expected End:  02/02/24            Increase functional transfers to/from bed, chair & commode to SBA with DME for safety       Start:  01/19/24    Expected End:  02/02/24            Demonstrate compliance to compensatory techs and safety techs during ADLs       Start:  01/19/24    Expected End:  02/02/24

## 2024-01-19 NOTE — PROGRESS NOTES
4:00 pm Per the [facility], they need 7000 and goldenrod to be completed prior to submitting for precert. Message to MD.   3:17 pm discussed with patient, currently the [facility] is his facility of choice. Per the [facility], they can accept, requested that precert be started.   1:51pm Noted that patient requiers assistance per PT/OT. Discussed with the patient and gave a list of facilities via [facility] in his residential area. He chose [facility] and the [facility] for referrals. Requested discharge support to make referrals to these facilities.     Met with the patient in the room, he states that he lives at home alone. He states that recently he has had issues with blurry vision, is weaker than normal. He also states that his thinking is not as clear as normal. He states that at baseline, he is independent at home. He was asking for the phone number to Medicaid, gave him general customer service number for [place] Medicaid as requested. Noted that he is ordered evaluation by PT/OT, will monitor for potential discharge needs.

## 2024-01-19 NOTE — NURSING NOTE
Pt's medication taken down to pharmacy at this time - pt made aware - pt just states he wants the meds back when he is discharge - will cont to monitor

## 2024-01-19 NOTE — PROGRESS NOTES
Physical Therapy    Physical Therapy Evaluation    Patient Name: Raul Jules  MRN: [id number]  Today's Date: 1/19/2024   Time Calculation  Start Time: 1038  Stop Time: 1100  Time Calculation (min): 22 min    Assessment/Plan   PT Assessment  Rehab Prognosis: Good  End of Session Communication: Bedside nurse, Care Coordinator  End of Session Patient Position: Bed, 2 rail up, Alarm on  IP OR SWING BED PT PLAN  Inpatient or Swing Bed: Inpatient  PT Plan  PT Plan: Skilled PT  PT Frequency: 3 times per week  PT Discharge Recommendations: Moderate intensity level of continued care  PT - OK to Discharge: Yes      Subjective   General Visit Information:  General  Reason for Referral: Eczema,failure to thrive,Blurry vision,unable self-care  Referred By: MURPHY Fierro  Past Medical History Relevant to Rehab: TBI,HSV of eye,anxiety,eczema,COPD,GERD,HTN,HLD  Prior to Session Communication: Bedside nurse  Patient Position Received: Bed, 2 rail up, Alarm on  General Comment: poor skin integrity  Home Living:  Home Living  Type of Home: House  Lives With: Alone  Home Layout: One level  Home Access: Stairs to enter with rails (4 steps)  Prior Level of Function:  Prior Function Per Pt/Caregiver Report  Level of [place]: Independent with ADLs and functional transfers, Independent with homemaking with ambulation  Prior Function Comments: ind amb w/o a device/no falls  Precautions:  Precautions  Medical Precautions: Fall precautions  Vital Signs:       Objective   Pain:  Pain Assessment  Pain Score: 8  Pain Type:  (R sh/hands)  Cognition:  Cognition  Overall Cognitive Status: Within Functional Limits    General Assessments:    Functional Assessments:  Bed Mobility  Bed Mobility: Yes (SBA)    Transfers  Transfer: Yes (Mod assist x 2)    Ambulation/Gait Training  Ambulation/Gait Training Performed: Yes (3 ft sidestep next to bed/wh walker/mod assist x 2)  Extremity/Trunk Assessments:    Outcome Measures:  [facility] Basic Mobility  Turning  from your back to your side while in a flat bed without using bedrails: A little  Moving from lying on your back to sitting on the side of a flat bed without using bedrails: A little  Moving to and from bed to chair (including a wheelchair): A lot  Standing up from a chair using your arms (e.g. wheelchair or bedside chair): A lot  To walk in hospital room: Total  Climbing 3-5 steps with railing: Total  Basic Mobility - Total Score: 12    Encounter Problems       Encounter Problems (Active)       PT Problem       bed mob       Start:  01/19/24    Expected End:  02/09/24       Ind bed mob         blue       Start:  01/19/24    Expected End:  02/09/24       SBA/min assist blue         gait       Start:  01/19/24    Expected End:  02/09/24       Amb 10 ft> wh walker w/ SBA to min assist         There ex.       Start:  01/19/24    Expected End:  02/09/24       10-30 reps LE's                Education Documentation  Mobility Training, taught by Raúl Munoz, PT at 1/19/2024  2:48 PM.  Learner: Patient  Readiness: Acceptance  Method: Explanation  Response: Verbalizes Understanding    ADL Training, taught by Raúl Munoz, PT at 1/19/2024  2:48 PM.  Learner: Patient  Readiness: Acceptance  Method: Explanation  Response: Verbalizes Understanding    Education Comments  No comments found.

## 2024-01-19 NOTE — H&P
History Of Present Illness  Raul Jules is a 58 y.o. male presenting to the emergency department for evaluation of a rash.  Patient states that he has had a rash on his hands, face, and feet for the last several years.  Patient states he had a similar rash to this years prior and was seen and evaluated at  [facility].  Patient was started on several medications at that time however he was noncompliant and did not continue to follow-up with dermatology.  Over the last several weeks he has been having worsening episodes of the rash patient presented to emergency department in hopes of seeing a dermatologist.  He also endorses blurry vision.  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  Patient also states that he is having difficulty taking care of himself at home.  Patient was also recently seen by ophthalmology who did a full exam and diagnosed him with dry eyes.  Patient is currently receiving eyedrops for this.  Patient denies chest pain or dizziness.  Patient denies shortness of breath, nausea, vomiting, diarrhea.    In ED, WBCs 14.0, urinalysis pending.  All other labs unremarkable.  Blood pressure 144/95, heart rate 89, respirations 16, temperature 36.0 °C, SpO2 95% on room air.  Patient medicated with Benadryl and prednisone p.o.  PT OT and social work consult ordered.  Patient admitted to observation under the care of Dr. Roger Lacey will continue to follow.  I was asked to do H&P and place initial admission orders.     Past Medical History  Anxiety, TBI, COPD, eczema, GERD, HSV of the eye, hyperlipidemia, hypertension    Surgical History  Cataract surgery, rotator cuff repair     Social History  Never smoker, no drug use, no alcohol use    Family History  Cataracts     Allergies  Patient has no known allergies.    Review of Systems  A 10 point review of systems was completed and negative except what is listed in HPI  Physical Exam  HENT:      Mouth/Throat:      Mouth: Mucous membranes  "are moist.      Pharynx: Oropharynx is clear.   Eyes:      Pupils: Pupils are equal, round, and reactive to light.   Cardiovascular:      Rate and Rhythm: Normal rate and regular rhythm.   Pulmonary:      Effort: Pulmonary effort is normal.      Breath sounds: Normal breath sounds.   Abdominal:      General: Bowel sounds are normal.      Palpations: Abdomen is soft.   Musculoskeletal:         General: Normal range of motion.      Cervical back: Normal range of motion.   Skin:     General: Skin is warm.      Comments: Scaly, dry, cracking rash on face/hands/feet, elbows, and chest   Neurological:      General: No focal deficit present.      Mental Status: He is alert and oriented to person, place, and time.   Psychiatric:         Mood and Affect: Mood normal.         Behavior: Behavior normal.          Last Recorded Vitals  Blood pressure (!) 186/93, pulse 94, temperature 36.4 °C (97.5 °F), resp. rate 16, height 1.753 m (5' 9\"), weight 83.9 kg (185 lb), SpO2 95 %.      Results for orders placed or performed during the hospital encounter of 01/18/24 (from the past 24 hour(s))   CBC and Auto Differential   Result Value Ref Range    WBC 14.0 (H) 4.4 - 11.3 x10*3/uL    nRBC 0.0 0.0 - 0.0 /100 WBCs    RBC 4.74 4.50 - 5.90 x10*6/uL    Hemoglobin 15.3 13.5 - 17.5 g/dL    Hematocrit 46.5 41.0 - 52.0 %    MCV 98 80 - 100 fL    MCH 32.3 26.0 - 34.0 pg    MCHC 32.9 32.0 - 36.0 g/dL    RDW 12.7 11.5 - 14.5 %    Platelets 415 150 - 450 x10*3/uL    Neutrophils % 79.8 40.0 - 80.0 %    Immature Granulocytes %, Automated 0.6 0.0 - 0.9 %    Lymphocytes % 8.8 13.0 - 44.0 %    Monocytes % 8.8 2.0 - 10.0 %    Eosinophils % 1.4 0.0 - 6.0 %    Basophils % 0.6 0.0 - 2.0 %    Neutrophils Absolute 11.17 (H) 1.20 - 7.70 x10*3/uL    Immature Granulocytes Absolute, Automated 0.08 0.00 - 0.70 x10*3/uL    Lymphocytes Absolute 1.23 1.20 - 4.80 x10*3/uL    Monocytes Absolute 1.23 (H) 0.10 - 1.00 x10*3/uL    Eosinophils Absolute 0.19 0.00 - 0.70 " x10*3/uL    Basophils Absolute 0.09 0.00 - 0.10 x10*3/uL   Comprehensive metabolic panel   Result Value Ref Range    Glucose 78 74 - 99 mg/dL    Sodium 139 136 - 145 mmol/L    Potassium 4.2 3.5 - 5.3 mmol/L    Chloride 103 98 - 107 mmol/L    Bicarbonate 28 21 - 32 mmol/L    Anion Gap 12 10 - 20 mmol/L    Urea Nitrogen 17 6 - 23 mg/dL    Creatinine 1.11 0.50 - 1.30 mg/dL    eGFR 76 >60 mL/min/1.73m*2    Calcium 10.1 8.6 - 10.3 mg/dL    Albumin 4.5 3.4 - 5.0 g/dL    Alkaline Phosphatase 58 33 - 136 U/L    Total Protein 7.3 6.4 - 8.2 g/dL    AST 20 9 - 39 U/L    Bilirubin, Total 0.5 0.0 - 1.2 mg/dL    ALT 21 10 - 52 U/L   C-Reactive Protein   Result Value Ref Range    C-Reactive Protein 0.69 <1.00 mg/dL   Sedimentation Rate   Result Value Ref Range    Sedimentation Rate 10 0 - 20 mm/h   Lactate   Result Value Ref Range    Lactate 1.3 0.4 - 2.0 mmol/L       Assessment/Plan   Raul is a 58-year-old male patient who is alert and oriented x 3 presenting to emergency department with a rash.  Patient states he has had this rash for the last several years but worsening over the last few weeks.  Patient states he had a rash like this years ago and followed up with a dermatologist, was prescribed medications which he never took.  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  Patient denies chest pain or dizziness.  Patient has a scaly, dry, cracking rash on his face, hands, feet, chest, and bilateral elbows.  Patient medicated with Benadryl and prednisone p.o.  Patient admitted for further medical management.    Rash/failure to thrive  Admit to observation per Dr. Roger Lacey  Prednisone p.o. daily  Benadryl x 2 IV in ED  Continue IV fluids  Urinalysis pending  Labs unremarkable  Aquaphor cream as needed  PT/OT  Social work consult for discharge planning  Repeat labs in am    GERD/hypertension/hyperlipidemia/COPD/eczema/HSV of the eye/TBI/anxiety  Continue home medications  Cardiac diet  IV Metoprolol X 1  for htn    DVT Ppx  SCDs  Activity as tolerated    I spent 25 minutes in the professional and overall care of this patient.      Marichuy Fierro, LAURENCE-CNP

## 2024-01-20 LAB
ANION GAP SERPL CALC-SCNC: 13 MMOL/L (ref 10–20)
BUN SERPL-MCNC: 18 MG/DL (ref 6–23)
CALCIUM SERPL-MCNC: 9.2 MG/DL (ref 8.6–10.3)
CHLORIDE SERPL-SCNC: 107 MMOL/L (ref 98–107)
CO2 SERPL-SCNC: 24 MMOL/L (ref 21–32)
CREAT SERPL-MCNC: 0.81 MG/DL (ref 0.5–1.3)
EGFRCR SERPLBLD CKD-EPI 2021: >90 ML/MIN/1.73M*2
ERYTHROCYTE [DISTWIDTH] IN BLOOD BY AUTOMATED COUNT: 12.8 % (ref 11.5–14.5)
GLUCOSE SERPL-MCNC: 91 MG/DL (ref 74–99)
HCT VFR BLD AUTO: 44.4 % (ref 41–52)
HGB BLD-MCNC: 14.9 G/DL (ref 13.5–17.5)
HIV 1+2 AB+HIV1 P24 AG SERPL QL IA: NONREACTIVE
MCH RBC QN AUTO: 32.5 PG (ref 26–34)
MCHC RBC AUTO-ENTMCNC: 33.6 G/DL (ref 32–36)
MCV RBC AUTO: 97 FL (ref 80–100)
NRBC BLD-RTO: 0 /100 WBCS (ref 0–0)
PLATELET # BLD AUTO: 395 X10*3/UL (ref 150–450)
POTASSIUM SERPL-SCNC: 4 MMOL/L (ref 3.5–5.3)
RBC # BLD AUTO: 4.58 X10*6/UL (ref 4.5–5.9)
SODIUM SERPL-SCNC: 140 MMOL/L (ref 136–145)
WBC # BLD AUTO: 11.4 X10*3/UL (ref 4.4–11.3)

## 2024-01-20 PROCEDURE — 80048 BASIC METABOLIC PNL TOTAL CA: CPT | Performed by: INTERNAL MEDICINE

## 2024-01-20 PROCEDURE — 96374 THER/PROPH/DIAG INJ IV PUSH: CPT | Mod: 59

## 2024-01-20 PROCEDURE — 94640 AIRWAY INHALATION TREATMENT: CPT

## 2024-01-20 PROCEDURE — G0378 HOSPITAL OBSERVATION PER HR: HCPCS

## 2024-01-20 PROCEDURE — 99232 SBSQ HOSP IP/OBS MODERATE 35: CPT | Performed by: INTERNAL MEDICINE

## 2024-01-20 PROCEDURE — 2500000004 HC RX 250 GENERAL PHARMACY W/ HCPCS (ALT 636 FOR OP/ED): Performed by: NURSE PRACTITIONER

## 2024-01-20 PROCEDURE — 2500000001 HC RX 250 WO HCPCS SELF ADMINISTERED DRUGS (ALT 637 FOR MEDICARE OP): Performed by: INTERNAL MEDICINE

## 2024-01-20 PROCEDURE — 36415 COLL VENOUS BLD VENIPUNCTURE: CPT | Performed by: INTERNAL MEDICINE

## 2024-01-20 PROCEDURE — 2500000004 HC RX 250 GENERAL PHARMACY W/ HCPCS (ALT 636 FOR OP/ED): Performed by: INTERNAL MEDICINE

## 2024-01-20 PROCEDURE — 2500000001 HC RX 250 WO HCPCS SELF ADMINISTERED DRUGS (ALT 637 FOR MEDICARE OP): Performed by: NURSE PRACTITIONER

## 2024-01-20 PROCEDURE — 85027 COMPLETE CBC AUTOMATED: CPT | Performed by: INTERNAL MEDICINE

## 2024-01-20 RX ORDER — AMLODIPINE BESYLATE 10 MG/1
10 TABLET ORAL DAILY
Start: 2024-01-21

## 2024-01-20 RX ORDER — AMLODIPINE BESYLATE 10 MG/1
10 TABLET ORAL DAILY
Status: DISCONTINUED | OUTPATIENT
Start: 2024-01-21 | End: 2024-01-28 | Stop reason: HOSPADM

## 2024-01-20 RX ADMIN — OXYCODONE HYDROCHLORIDE AND ACETAMINOPHEN 1 TABLET: 5; 325 TABLET ORAL at 08:57

## 2024-01-20 RX ADMIN — PREDNISOLONE ACETATE 1 DROP: 10 SUSPENSION/ DROPS OPHTHALMIC at 17:00

## 2024-01-20 RX ADMIN — DIPHENHYDRAMINE HYDROCHLORIDE 25 MG: 50 INJECTION, SOLUTION INTRAMUSCULAR; INTRAVENOUS at 19:39

## 2024-01-20 RX ADMIN — LORATADINE 10 MG: 10 TABLET ORAL at 08:52

## 2024-01-20 RX ADMIN — FLUTICASONE FUROATE AND VILANTEROL TRIFENATATE 1 PUFF: 200; 25 POWDER RESPIRATORY (INHALATION) at 08:56

## 2024-01-20 RX ADMIN — PREDNISOLONE ACETATE 1 DROP: 10 SUSPENSION/ DROPS OPHTHALMIC at 13:52

## 2024-01-20 RX ADMIN — LOSARTAN POTASSIUM 100 MG: 50 TABLET, FILM COATED ORAL at 08:52

## 2024-01-20 RX ADMIN — Medication: at 09:00

## 2024-01-20 RX ADMIN — Medication 1 TABLET: at 08:52

## 2024-01-20 RX ADMIN — PREDNISONE 40 MG: 20 TABLET ORAL at 08:52

## 2024-01-20 RX ADMIN — PREDNISOLONE ACETATE 1 DROP: 10 SUSPENSION/ DROPS OPHTHALMIC at 20:43

## 2024-01-20 RX ADMIN — AMLODIPINE BESYLATE 5 MG: 5 TABLET ORAL at 08:52

## 2024-01-20 RX ADMIN — PANTOPRAZOLE SODIUM 20 MG: 20 TABLET, DELAYED RELEASE ORAL at 07:00

## 2024-01-20 RX ADMIN — ALPRAZOLAM 0.5 MG: 0.25 TABLET ORAL at 08:52

## 2024-01-20 RX ADMIN — PREDNISOLONE ACETATE 1 DROP: 10 SUSPENSION/ DROPS OPHTHALMIC at 07:00

## 2024-01-20 ASSESSMENT — COGNITIVE AND FUNCTIONAL STATUS - GENERAL
HELP NEEDED FOR BATHING: A LITTLE
DAILY ACTIVITIY SCORE: 19
CLIMB 3 TO 5 STEPS WITH RAILING: A LITTLE
WALKING IN HOSPITAL ROOM: A LITTLE
DRESSING REGULAR UPPER BODY CLOTHING: A LITTLE
TOILETING: A LITTLE
STANDING UP FROM CHAIR USING ARMS: A LITTLE
MOVING TO AND FROM BED TO CHAIR: A LITTLE
DAILY ACTIVITIY SCORE: 19
HELP NEEDED FOR BATHING: A LITTLE
PERSONAL GROOMING: A LITTLE
HELP NEEDED FOR BATHING: A LITTLE
DRESSING REGULAR LOWER BODY CLOTHING: A LITTLE
WALKING IN HOSPITAL ROOM: A LITTLE
STANDING UP FROM CHAIR USING ARMS: A LITTLE
PERSONAL GROOMING: A LITTLE
PERSONAL GROOMING: A LITTLE
DRESSING REGULAR LOWER BODY CLOTHING: A LITTLE
MOBILITY SCORE: 20
DAILY ACTIVITIY SCORE: 19
DRESSING REGULAR UPPER BODY CLOTHING: A LITTLE
DRESSING REGULAR LOWER BODY CLOTHING: A LITTLE
MOBILITY SCORE: 20
MOBILITY SCORE: 20
TOILETING: A LITTLE
MOVING TO AND FROM BED TO CHAIR: A LITTLE
TOILETING: A LITTLE
DRESSING REGULAR UPPER BODY CLOTHING: A LITTLE
CLIMB 3 TO 5 STEPS WITH RAILING: A LITTLE
MOVING TO AND FROM BED TO CHAIR: A LITTLE
WALKING IN HOSPITAL ROOM: A LITTLE
CLIMB 3 TO 5 STEPS WITH RAILING: A LITTLE
STANDING UP FROM CHAIR USING ARMS: A LITTLE

## 2024-01-20 ASSESSMENT — PAIN - FUNCTIONAL ASSESSMENT: PAIN_FUNCTIONAL_ASSESSMENT: 0-10

## 2024-01-20 ASSESSMENT — PAIN SCALES - GENERAL
PAINLEVEL_OUTOF10: 0 - NO PAIN
PAINLEVEL_OUTOF10: 5 - MODERATE PAIN

## 2024-01-20 NOTE — CARE PLAN
The patient's goals for the shift include pain management     The clinical goals for the shift include pt will be injury free      Problem: Pain  Goal: Takes deep breaths with improved pain control throughout the shift  Outcome: Progressing     Problem: Pain  Goal: Turns in bed with improved pain control throughout the shift  Outcome: Met     Problem: Fall/Injury  Goal: Not fall by end of shift  Outcome: Met     Problem: Pain  Goal: Turns in bed with improved pain control throughout the shift  Outcome: Met     Problem: Fall/Injury  Goal: Not fall by end of shift  Outcome: Met

## 2024-01-20 NOTE — PROGRESS NOTES
TCC Note: Saw that TCC yesterday placed note that MD needs to sign [place] form in order to start precert for this patient to go to the [facility]- Facility is requiring a Signed [place] form first. Checked to see if [place] was signed so that we can send to facility to start precert and it was not signed by MD. In order to facilitate the process, I instant messaged MD again however, [place] not signed as of 11:09 AM. Will continue to follow for a signed form to start precert then inform/send to facility. Thais Camargo, RAMAN, RN, TCC.    ADDENDUM: Spoke with pt to obtain Social Security number so that The [facility] can move forward with Precert. Number obtained. DSC and SW aware. Thais Camargo, MSN, RN, TCC.

## 2024-01-20 NOTE — H&P
History Of Present Illness  Raul Jules is a 58 y.o. male presenting to the emergency department for evaluation of a rash.  Patient states that he has had a rash on his hands, face, and feet for the last several years.  Patient states he had a similar rash to this years prior and was seen and evaluated at  [facility] [place].  Patient was started on several medications at that time however he was noncompliant and did not continue to follow-up with dermatology.  Over the last several weeks he has been having worsening episodes of the rash patient presented to emergency department in hopes of seeing a dermatologist.  He also endorses blurry vision.  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  Patient also states that he is having difficulty taking care of himself at home.  Patient was also recently seen by ophthalmology who did a full exam and diagnosed him with dry eyes.  Patient is currently receiving eyedrops for this.  Patient denies chest pain or dizziness.  Patient denies shortness of breath, nausea, vomiting, diarrhea.    In ED, WBCs 14.0, urinalysis pending.  All other labs unremarkable.  Blood pressure 144/95, heart rate 89, respirations 16, temperature 36.0 °C, SpO2 95% on room air.  Patient medicated with Benadryl and prednisone p.o.  PT OT and social work consult ordered.  Patient admitted to observation under the care of Dr. Roger Lacey will continue to follow.  I was asked to do H&P and place initial admission orders.     Patient seen and examined at bedside.  Complains of diffuse itching and feeling like he is getting bitten.  Photos shared with infectious disease who agrees with empiric treatment for scabies and head lice.    Past Medical History  Anxiety, TBI, COPD, eczema, GERD, HSV of the eye, hyperlipidemia, hypertension    Surgical History  Cataract surgery, rotator cuff repair     Social History  Never smoker, no drug use, no alcohol use    Family History  Cataracts    "  Allergies  Patient has no known allergies.    Review of Systems  A 10 point review of systems was completed and negative except what is listed in HPI  Physical Exam  HENT:      Mouth/Throat:      Mouth: Mucous membranes are moist.      Pharynx: Oropharynx is clear.   Eyes:      Pupils: Pupils are equal, round, and reactive to light.   Cardiovascular:      Rate and Rhythm: Normal rate and regular rhythm.   Pulmonary:      Effort: Pulmonary effort is normal.      Breath sounds: Normal breath sounds.   Abdominal:      General: Bowel sounds are normal.      Palpations: Abdomen is soft.   Musculoskeletal:         General: Normal range of motion.      Cervical back: Normal range of motion.   Skin:     General: Skin is warm.      Comments: Scaly, dry, cracking rash on face/hands/feet, elbows, and chest   Neurological:      General: No focal deficit present.      Mental Status: He is alert and oriented to person, place, and time.   Psychiatric:         Mood and Affect: Mood normal.         Behavior: Behavior normal.          Last Recorded Vitals  Blood pressure (!) 176/97, pulse 90, temperature 36.5 °C (97.7 °F), temperature source Temporal, resp. rate 18, height 1.75 m (5' 8.9\"), weight 90 kg (198 lb 6.6 oz), SpO2 96 %.      Results for orders placed or performed during the hospital encounter of 01/18/24 (from the past 24 hour(s))   CBC   Result Value Ref Range    WBC 8.0 4.4 - 11.3 x10*3/uL    nRBC 0.0 0.0 - 0.0 /100 WBCs    RBC 4.15 (L) 4.50 - 5.90 x10*6/uL    Hemoglobin 13.4 (L) 13.5 - 17.5 g/dL    Hematocrit 41.1 41.0 - 52.0 %    MCV 99 80 - 100 fL    MCH 32.3 26.0 - 34.0 pg    MCHC 32.6 32.0 - 36.0 g/dL    RDW 12.8 11.5 - 14.5 %    Platelets 389 150 - 450 x10*3/uL   Basic Metabolic Panel   Result Value Ref Range    Glucose 103 (H) 74 - 99 mg/dL    Sodium 139 136 - 145 mmol/L    Potassium 4.5 3.5 - 5.3 mmol/L    Chloride 107 98 - 107 mmol/L    Bicarbonate 24 21 - 32 mmol/L    Anion Gap 13 10 - 20 mmol/L    Urea " Nitrogen 20 6 - 23 mg/dL    Creatinine 0.75 0.50 - 1.30 mg/dL    eGFR >90 >60 mL/min/1.73m*2    Calcium 9.2 8.6 - 10.3 mg/dL   Urinalysis with Reflex Microscopic   Result Value Ref Range    Color, Urine Yellow Straw, Yellow    Appearance, Urine Clear Clear    Specific Gravity, Urine 1.024 1.005 - 1.035    pH, Urine 5.0 5.0, 5.5, 6.0, 6.5, 7.0, 7.5, 8.0    Protein, Urine NEGATIVE NEGATIVE mg/dL    Glucose, Urine NEGATIVE NEGATIVE mg/dL    Blood, Urine NEGATIVE NEGATIVE    Ketones, Urine NEGATIVE NEGATIVE mg/dL    Bilirubin, Urine NEGATIVE NEGATIVE    Urobilinogen, Urine <2.0 <2.0 mg/dL    Nitrite, Urine NEGATIVE NEGATIVE    Leukocyte Esterase, Urine NEGATIVE NEGATIVE       Assessment/Plan   Raul is a 58-year-old male patient who is alert and oriented x 3 presenting to emergency department with a rash.  Patient states he has had this rash for the last several years but worsening over the last few weeks.  Patient states he had a rash like this years ago and followed up with a dermatologist, was prescribed medications which he never took.  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  Patient denies chest pain or dizziness.  Patient has a scaly, dry, cracking rash on his face, hands, feet, chest, and bilateral elbows.  Patient medicated with Benadryl and prednisone p.o.  Patient admitted for further medical management.    Rash/failure to thrive  Suspected scabies  Head lice  Admit to observation per Dr. Roger Lacey  Prednisone p.o. daily  Benadryl x 2 IV in ED  Continue IV fluids  Urinalysis pending  Labs unremarkable  Aquaphor cream as needed  PT/OT  Social work consult for discharge planning  Repeat labs in am  Permethrin cream for skin  Permethrin liquid for scalp  ID consulted, appreciate recs  HIV screening per ID recs    GERD/hypertension/hyperlipidemia/COPD/eczema/HSV of the eye/TBI/anxiety  Continue home medications  Cardiac diet  IV Metoprolol X 1 for htn    DVT Ppx  SCDs  Activity as  tolerated    I spent 65 minutes in the professional and overall care of this patient.      Roger Lacey, DO

## 2024-01-20 NOTE — PROGRESS NOTES
"Raul Jules is a 58 y.o. male on day 0 of admission presenting with General symptom.      Subjective   No events overnight.  Patient seen and examined at bedside.  He feels better after his treatment with permethrin.  His pruritus is much improved and the \"biting\" sensation he is having is much better as well.       Objective     Last Recorded Vitals  /89 (BP Location: Right arm, Patient Position: Sitting)   Pulse 82   Temp 36.9 °C (98.4 °F) (Temporal)   Resp 18   Wt 90 kg (198 lb 6.6 oz)   SpO2 95%   Intake/Output last 3 Shifts:  No intake or output data in the 24 hours ending 01/20/24 1759    Admission Weight  Weight: 83.9 kg (185 lb) (01/18/24 1242)    Daily Weight  01/19/24 : 90 kg (198 lb 6.6 oz)    Image Results  No image results found.      Physical Exam  HENT:      Mouth/Throat:      Mouth: Mucous membranes are moist.      Pharynx: Oropharynx is clear.   Eyes:      Pupils: Pupils are equal, round, and reactive to light.   Cardiovascular:      Rate and Rhythm: Normal rate and regular rhythm.   Pulmonary:      Effort: Pulmonary effort is normal.      Breath sounds: Normal breath sounds.   Abdominal:      General: Bowel sounds are normal.      Palpations: Abdomen is soft.   Musculoskeletal:         General: Normal range of motion.      Cervical back: Normal range of motion.   Skin:     General: Skin is warm.      Comments: Scaly, dry, cracking rash on face/hands/feet, elbows, and chest   Neurological:      General: No focal deficit present.      Mental Status: He is alert and oriented to person, place, and time.   Psychiatric:         Mood and Affect: Mood normal.         Behavior: Behavior normal.      Relevant Results               Assessment/Plan        Principal Problem:    General symptom    Raul is a 58-year-old male patient who is alert and oriented x 3 presenting to emergency department with a rash.  Patient states he has had this rash for the last several years but worsening over the last " few weeks.  Patient states he had a rash like this years ago and followed up with a dermatologist, was prescribed medications which he never took.  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  Patient denies chest pain or dizziness.  Patient has a scaly, dry, cracking rash on his face, hands, feet, chest, and bilateral elbows.  Patient medicated with Benadryl and prednisone p.o.  Patient admitted for further medical management.     Rash/failure to thrive  Suspected scabies  Head lice  Admit to observation per Dr. Roger Lacey  Prednisone p.o. daily  Benadryl x 2 IV in ED  Continue IV fluids  Urinalysis pending  Labs unremarkable  Aquaphor cream as needed  PT/OT  Social work consult for discharge planning  Repeat labs in am  Permethrin cream for skin  Permethrin liquid for scalp  ID consulted, appreciate recs  HIV screening per ID recs     GERD/hypertension/hyperlipidemia/COPD/eczema/HSV of the eye/TBI/anxiety  Continue home medications  Cardiac diet  IV Metoprolol X 1 for htn     DVT Ppx  SCDs  Activity as tolerated     1/20: Patient is status posttreatment with permethrin for suspected scabies and head lice.  He has pruritus and formication have improved.  Blood pressures remain elevated, increase amlodipine no started yesterday.  Patient medically ready for discharge to SNF.              Roger Lacey, DO

## 2024-01-21 LAB
ANION GAP SERPL CALC-SCNC: 12 MMOL/L (ref 10–20)
BUN SERPL-MCNC: 21 MG/DL (ref 6–23)
CALCIUM SERPL-MCNC: 8.9 MG/DL (ref 8.6–10.3)
CHLORIDE SERPL-SCNC: 104 MMOL/L (ref 98–107)
CO2 SERPL-SCNC: 27 MMOL/L (ref 21–32)
CREAT SERPL-MCNC: 0.75 MG/DL (ref 0.5–1.3)
EGFRCR SERPLBLD CKD-EPI 2021: >90 ML/MIN/1.73M*2
ERYTHROCYTE [DISTWIDTH] IN BLOOD BY AUTOMATED COUNT: 12.8 % (ref 11.5–14.5)
GLUCOSE SERPL-MCNC: 80 MG/DL (ref 74–99)
HCT VFR BLD AUTO: 47.2 % (ref 41–52)
HGB BLD-MCNC: 14.8 G/DL (ref 13.5–17.5)
MCH RBC QN AUTO: 32.2 PG (ref 26–34)
MCHC RBC AUTO-ENTMCNC: 31.4 G/DL (ref 32–36)
MCV RBC AUTO: 103 FL (ref 80–100)
NRBC BLD-RTO: 0 /100 WBCS (ref 0–0)
PLATELET # BLD AUTO: 393 X10*3/UL (ref 150–450)
POTASSIUM SERPL-SCNC: 4 MMOL/L (ref 3.5–5.3)
RBC # BLD AUTO: 4.59 X10*6/UL (ref 4.5–5.9)
SODIUM SERPL-SCNC: 139 MMOL/L (ref 136–145)
WBC # BLD AUTO: 13.5 X10*3/UL (ref 4.4–11.3)

## 2024-01-21 PROCEDURE — 2500000001 HC RX 250 WO HCPCS SELF ADMINISTERED DRUGS (ALT 637 FOR MEDICARE OP): Performed by: NURSE PRACTITIONER

## 2024-01-21 PROCEDURE — 99232 SBSQ HOSP IP/OBS MODERATE 35: CPT | Performed by: INTERNAL MEDICINE

## 2024-01-21 PROCEDURE — G0378 HOSPITAL OBSERVATION PER HR: HCPCS

## 2024-01-21 PROCEDURE — 36415 COLL VENOUS BLD VENIPUNCTURE: CPT | Performed by: INTERNAL MEDICINE

## 2024-01-21 PROCEDURE — 2500000004 HC RX 250 GENERAL PHARMACY W/ HCPCS (ALT 636 FOR OP/ED): Performed by: NURSE PRACTITIONER

## 2024-01-21 PROCEDURE — 94640 AIRWAY INHALATION TREATMENT: CPT

## 2024-01-21 PROCEDURE — 96376 TX/PRO/DX INJ SAME DRUG ADON: CPT

## 2024-01-21 PROCEDURE — 2500000001 HC RX 250 WO HCPCS SELF ADMINISTERED DRUGS (ALT 637 FOR MEDICARE OP): Performed by: INTERNAL MEDICINE

## 2024-01-21 PROCEDURE — 85027 COMPLETE CBC AUTOMATED: CPT | Performed by: INTERNAL MEDICINE

## 2024-01-21 PROCEDURE — 2500000004 HC RX 250 GENERAL PHARMACY W/ HCPCS (ALT 636 FOR OP/ED): Performed by: INTERNAL MEDICINE

## 2024-01-21 PROCEDURE — 80048 BASIC METABOLIC PNL TOTAL CA: CPT | Performed by: INTERNAL MEDICINE

## 2024-01-21 RX ADMIN — LOSARTAN POTASSIUM 100 MG: 50 TABLET, FILM COATED ORAL at 09:20

## 2024-01-21 RX ADMIN — OXYCODONE HYDROCHLORIDE AND ACETAMINOPHEN 1 TABLET: 5; 325 TABLET ORAL at 09:39

## 2024-01-21 RX ADMIN — OXYCODONE HYDROCHLORIDE AND ACETAMINOPHEN 1 TABLET: 5; 325 TABLET ORAL at 01:25

## 2024-01-21 RX ADMIN — OXYCODONE HYDROCHLORIDE AND ACETAMINOPHEN 1 TABLET: 5; 325 TABLET ORAL at 23:28

## 2024-01-21 RX ADMIN — DIPHENHYDRAMINE HYDROCHLORIDE 25 MG: 50 INJECTION, SOLUTION INTRAMUSCULAR; INTRAVENOUS at 06:27

## 2024-01-21 RX ADMIN — FLUTICASONE FUROATE AND VILANTEROL TRIFENATATE 1 PUFF: 200; 25 POWDER RESPIRATORY (INHALATION) at 07:34

## 2024-01-21 RX ADMIN — ALPRAZOLAM 0.5 MG: 0.25 TABLET ORAL at 09:20

## 2024-01-21 RX ADMIN — Medication 1 TABLET: at 09:19

## 2024-01-21 RX ADMIN — PREDNISOLONE ACETATE 1 DROP: 10 SUSPENSION/ DROPS OPHTHALMIC at 21:02

## 2024-01-21 RX ADMIN — PREDNISOLONE ACETATE 1 DROP: 10 SUSPENSION/ DROPS OPHTHALMIC at 13:00

## 2024-01-21 RX ADMIN — Medication: at 09:00

## 2024-01-21 RX ADMIN — PREDNISOLONE ACETATE 1 DROP: 10 SUSPENSION/ DROPS OPHTHALMIC at 06:22

## 2024-01-21 RX ADMIN — PREDNISOLONE ACETATE 1 DROP: 10 SUSPENSION/ DROPS OPHTHALMIC at 17:00

## 2024-01-21 RX ADMIN — AMLODIPINE BESYLATE 10 MG: 10 TABLET ORAL at 09:19

## 2024-01-21 RX ADMIN — PANTOPRAZOLE SODIUM 20 MG: 20 TABLET, DELAYED RELEASE ORAL at 06:21

## 2024-01-21 RX ADMIN — LORATADINE 10 MG: 10 TABLET ORAL at 09:20

## 2024-01-21 ASSESSMENT — COGNITIVE AND FUNCTIONAL STATUS - GENERAL
MOVING TO AND FROM BED TO CHAIR: A LITTLE
MOBILITY SCORE: 20
DRESSING REGULAR UPPER BODY CLOTHING: A LITTLE
DRESSING REGULAR LOWER BODY CLOTHING: A LITTLE
STANDING UP FROM CHAIR USING ARMS: A LITTLE
TOILETING: A LITTLE
HELP NEEDED FOR BATHING: A LITTLE
PERSONAL GROOMING: A LITTLE
DAILY ACTIVITIY SCORE: 19
WALKING IN HOSPITAL ROOM: A LITTLE
CLIMB 3 TO 5 STEPS WITH RAILING: A LITTLE

## 2024-01-21 ASSESSMENT — PAIN - FUNCTIONAL ASSESSMENT
PAIN_FUNCTIONAL_ASSESSMENT: 0-10

## 2024-01-21 ASSESSMENT — PAIN DESCRIPTION - ORIENTATION
ORIENTATION: RIGHT;LEFT
ORIENTATION: RIGHT

## 2024-01-21 ASSESSMENT — PAIN SCALES - GENERAL
PAINLEVEL_OUTOF10: 5 - MODERATE PAIN
PAINLEVEL_OUTOF10: 6
PAINLEVEL_OUTOF10: 2
PAINLEVEL_OUTOF10: 7

## 2024-01-21 ASSESSMENT — PAIN DESCRIPTION - LOCATION
LOCATION: SHOULDER
LOCATION: SHOULDER
LOCATION: GENERALIZED

## 2024-01-21 NOTE — PROGRESS NOTES
Raul Jules is a 58 y.o. male on day 0 of admission presenting with General symptom.      Subjective   No events overnight.  Patient seen and examined at bedside.  He had bleeding from the cracks in his face earlier.        Objective     Last Recorded Vitals  /78 (BP Location: Left arm, Patient Position: Lying)   Pulse 85   Temp 36.8 °C (98.2 °F) (Temporal)   Resp 20   Wt 90 kg (198 lb 6.6 oz)   SpO2 96%   Intake/Output last 3 Shifts:  No intake or output data in the 24 hours ending 01/21/24 1517    Admission Weight  Weight: 83.9 kg (185 lb) (01/18/24 1242)    Daily Weight  01/19/24 : 90 kg (198 lb 6.6 oz)    Image Results  No image results found.      Physical Exam  HENT:      Mouth/Throat:      Mouth: Mucous membranes are moist.      Pharynx: Oropharynx is clear.   Eyes:      Pupils: Pupils are equal, round, and reactive to light.   Cardiovascular:      Rate and Rhythm: Normal rate and regular rhythm.   Pulmonary:      Effort: Pulmonary effort is normal.      Breath sounds: Normal breath sounds.   Abdominal:      General: Bowel sounds are normal.      Palpations: Abdomen is soft.   Musculoskeletal:         General: Normal range of motion.      Cervical back: Normal range of motion.   Skin:     General: Skin is warm.      Comments: Scaly, dry, cracking rash on face/hands/feet, elbows, and chest   Neurological:      General: No focal deficit present.      Mental Status: He is alert and oriented to person, place, and time.   Psychiatric:         Mood and Affect: Mood normal.         Behavior: Behavior normal.      Relevant Results               Assessment/Plan        Principal Problem:    General symptom    Raul is a 58-year-old male patient who is alert and oriented x 3 presenting to emergency department with a rash.  Patient states he has had this rash for the last several years but worsening over the last few weeks.  Patient states he had a rash like this years ago and followed up with a  dermatologist, was prescribed medications which he never took.  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  Patient denies chest pain or dizziness.  Patient has a scaly, dry, cracking rash on his face, hands, feet, chest, and bilateral elbows.  Patient medicated with Benadryl and prednisone p.o.  Patient admitted for further medical management.     Rash/failure to thrive  Suspected scabies  Head lice  Admit to observation per Dr. Roger Lacey  Prednisone p.o. daily  Benadryl x 2 IV in ED  Continue IV fluids  Urinalysis pending  Labs unremarkable  Aquaphor cream as needed  PT/OT  Social work consult for discharge planning  Repeat labs in am  Permethrin cream for skin  Permethrin liquid for scalp  ID consulted, appreciate recs  HIV screening per ID recs     GERD/hypertension/hyperlipidemia/COPD/eczema/HSV of the eye/TBI/anxiety  Continue home medications  Cardiac diet  IV Metoprolol X 1 for htn     DVT Ppx  SCDs  Activity as tolerated     1/20: Patient is status post treatment with permethrin for suspected scabies and head lice.  He has pruritus and formication have improved.  Blood pressures remain elevated, increase amlodipine no started yesterday.  Patient medically ready for discharge to SNF.    1/21: HIV negative.  Awaiting pre[facility] nursing facility.  Outpatient dermatology follow-up.              Roger Lacey DO

## 2024-01-21 NOTE — CARE PLAN
The patient's goals for the shift include      The clinical goals for the shift include patient's skin remian less itchy

## 2024-01-21 NOTE — PROGRESS NOTES
[facility] confirmed that precert was started by facility on 1/20. [facility] Precert Reference #: [id number]       JUSTIN ELIZABETH

## 2024-01-22 LAB
ANION GAP SERPL CALC-SCNC: 12 MMOL/L (ref 10–20)
BUN SERPL-MCNC: 17 MG/DL (ref 6–23)
CALCIUM SERPL-MCNC: 8.6 MG/DL (ref 8.6–10.3)
CHLORIDE SERPL-SCNC: 104 MMOL/L (ref 98–107)
CO2 SERPL-SCNC: 25 MMOL/L (ref 21–32)
CREAT SERPL-MCNC: 0.57 MG/DL (ref 0.5–1.3)
EGFRCR SERPLBLD CKD-EPI 2021: >90 ML/MIN/1.73M*2
ERYTHROCYTE [DISTWIDTH] IN BLOOD BY AUTOMATED COUNT: 12.8 % (ref 11.5–14.5)
GLUCOSE SERPL-MCNC: 91 MG/DL (ref 74–99)
HCT VFR BLD AUTO: 45.7 % (ref 41–52)
HGB BLD-MCNC: 15.1 G/DL (ref 13.5–17.5)
MCH RBC QN AUTO: 32.7 PG (ref 26–34)
MCHC RBC AUTO-ENTMCNC: 33 G/DL (ref 32–36)
MCV RBC AUTO: 99 FL (ref 80–100)
NRBC BLD-RTO: 0 /100 WBCS (ref 0–0)
PLATELET # BLD AUTO: 383 X10*3/UL (ref 150–450)
POTASSIUM SERPL-SCNC: 4.1 MMOL/L (ref 3.5–5.3)
RBC # BLD AUTO: 4.62 X10*6/UL (ref 4.5–5.9)
SODIUM SERPL-SCNC: 137 MMOL/L (ref 136–145)
WBC # BLD AUTO: 10.9 X10*3/UL (ref 4.4–11.3)

## 2024-01-22 PROCEDURE — 2500000004 HC RX 250 GENERAL PHARMACY W/ HCPCS (ALT 636 FOR OP/ED)

## 2024-01-22 PROCEDURE — G0378 HOSPITAL OBSERVATION PER HR: HCPCS

## 2024-01-22 PROCEDURE — 99232 SBSQ HOSP IP/OBS MODERATE 35: CPT | Performed by: INTERNAL MEDICINE

## 2024-01-22 PROCEDURE — 97535 SELF CARE MNGMENT TRAINING: CPT | Mod: GP,CQ

## 2024-01-22 PROCEDURE — 97116 GAIT TRAINING THERAPY: CPT | Mod: GP,CQ

## 2024-01-22 PROCEDURE — 2500000001 HC RX 250 WO HCPCS SELF ADMINISTERED DRUGS (ALT 637 FOR MEDICARE OP): Performed by: NURSE PRACTITIONER

## 2024-01-22 PROCEDURE — 97535 SELF CARE MNGMENT TRAINING: CPT | Mod: CO,GO

## 2024-01-22 PROCEDURE — 85027 COMPLETE CBC AUTOMATED: CPT | Performed by: INTERNAL MEDICINE

## 2024-01-22 PROCEDURE — 94640 AIRWAY INHALATION TREATMENT: CPT

## 2024-01-22 PROCEDURE — 96375 TX/PRO/DX INJ NEW DRUG ADDON: CPT

## 2024-01-22 PROCEDURE — 36415 COLL VENOUS BLD VENIPUNCTURE: CPT | Performed by: INTERNAL MEDICINE

## 2024-01-22 PROCEDURE — 2500000002 HC RX 250 W HCPCS SELF ADMINISTERED DRUGS (ALT 637 FOR MEDICARE OP, ALT 636 FOR OP/ED): Performed by: NURSE PRACTITIONER

## 2024-01-22 PROCEDURE — 2500000004 HC RX 250 GENERAL PHARMACY W/ HCPCS (ALT 636 FOR OP/ED): Performed by: NURSE PRACTITIONER

## 2024-01-22 PROCEDURE — 80048 BASIC METABOLIC PNL TOTAL CA: CPT | Performed by: INTERNAL MEDICINE

## 2024-01-22 PROCEDURE — 96365 THER/PROPH/DIAG IV INF INIT: CPT

## 2024-01-22 PROCEDURE — 2500000001 HC RX 250 WO HCPCS SELF ADMINISTERED DRUGS (ALT 637 FOR MEDICARE OP): Performed by: INTERNAL MEDICINE

## 2024-01-22 RX ORDER — DIPHENHYDRAMINE HCL 25 MG
25 CAPSULE ORAL EVERY 6 HOURS PRN
Status: DISCONTINUED | OUTPATIENT
Start: 2024-01-22 | End: 2024-01-28 | Stop reason: HOSPADM

## 2024-01-22 RX ADMIN — LORATADINE 10 MG: 10 TABLET ORAL at 09:31

## 2024-01-22 RX ADMIN — OXYCODONE HYDROCHLORIDE AND ACETAMINOPHEN 1 TABLET: 5; 325 TABLET ORAL at 09:31

## 2024-01-22 RX ADMIN — METHYLPREDNISOLONE SODIUM SUCCINATE 40 MG: 40 INJECTION, POWDER, FOR SOLUTION INTRAMUSCULAR; INTRAVENOUS at 20:06

## 2024-01-22 RX ADMIN — FLUTICASONE FUROATE AND VILANTEROL TRIFENATATE 1 PUFF: 200; 25 POWDER RESPIRATORY (INHALATION) at 07:03

## 2024-01-22 RX ADMIN — OXYCODONE HYDROCHLORIDE AND ACETAMINOPHEN 1 TABLET: 5; 325 TABLET ORAL at 20:07

## 2024-01-22 RX ADMIN — VANCOMYCIN HYDROCHLORIDE 1.25 G: 1.25 INJECTION, POWDER, LYOPHILIZED, FOR SOLUTION INTRAVENOUS at 20:06

## 2024-01-22 RX ADMIN — PREDNISOLONE ACETATE 1 DROP: 10 SUSPENSION/ DROPS OPHTHALMIC at 20:05

## 2024-01-22 RX ADMIN — PREDNISOLONE ACETATE 1 DROP: 10 SUSPENSION/ DROPS OPHTHALMIC at 06:31

## 2024-01-22 RX ADMIN — PANTOPRAZOLE SODIUM 20 MG: 20 TABLET, DELAYED RELEASE ORAL at 06:29

## 2024-01-22 RX ADMIN — DIPHENHYDRAMINE HYDROCHLORIDE 25 MG: 25 CAPSULE ORAL at 13:07

## 2024-01-22 RX ADMIN — Medication 1 TABLET: at 09:30

## 2024-01-22 RX ADMIN — AMLODIPINE BESYLATE 10 MG: 10 TABLET ORAL at 09:30

## 2024-01-22 RX ADMIN — ALPRAZOLAM 0.5 MG: 0.25 TABLET ORAL at 09:31

## 2024-01-22 RX ADMIN — DIPHENHYDRAMINE HYDROCHLORIDE 25 MG: 25 CAPSULE ORAL at 20:07

## 2024-01-22 RX ADMIN — LOSARTAN POTASSIUM 100 MG: 50 TABLET, FILM COATED ORAL at 09:31

## 2024-01-22 ASSESSMENT — COGNITIVE AND FUNCTIONAL STATUS - GENERAL
TOILETING: A LITTLE
MOBILITY SCORE: 17
WALKING IN HOSPITAL ROOM: A LITTLE
TURNING FROM BACK TO SIDE WHILE IN FLAT BAD: A LITTLE
STANDING UP FROM CHAIR USING ARMS: A LITTLE
DRESSING REGULAR UPPER BODY CLOTHING: A LITTLE
PERSONAL GROOMING: A LITTLE
DRESSING REGULAR LOWER BODY CLOTHING: A LITTLE
MOVING TO AND FROM BED TO CHAIR: A LITTLE
DAILY ACTIVITIY SCORE: 19
HELP NEEDED FOR BATHING: A LITTLE
CLIMB 3 TO 5 STEPS WITH RAILING: A LOT
MOVING FROM LYING ON BACK TO SITTING ON SIDE OF FLAT BED WITH BEDRAILS: A LITTLE

## 2024-01-22 ASSESSMENT — PAIN - FUNCTIONAL ASSESSMENT
PAIN_FUNCTIONAL_ASSESSMENT: 0-10

## 2024-01-22 ASSESSMENT — PAIN SCALES - GENERAL
PAINLEVEL_OUTOF10: 7
PAINLEVEL_OUTOF10: 0 - NO PAIN
PAINLEVEL_OUTOF10: 2
PAINLEVEL_OUTOF10: 7

## 2024-01-22 ASSESSMENT — PAIN DESCRIPTION - LOCATION: LOCATION: OTHER (COMMENT)

## 2024-01-22 ASSESSMENT — PAIN DESCRIPTION - DESCRIPTORS: DESCRIPTORS: ACHING

## 2024-01-22 ASSESSMENT — ACTIVITIES OF DAILY LIVING (ADL): HOME_MANAGEMENT_TIME_ENTRY: 24

## 2024-01-22 NOTE — PROGRESS NOTES
Vancomycin Dosing by Pharmacy- INITIAL    Raul Jules is a 58 y.o. year old male who Pharmacy has been consulted for vancomycin dosing for cellulitis, skin and soft tissue. Based on the patient's indication and renal status this patient will be dosed based on a goal AUC of 400-600.     Renal function is currently improving.    Visit Vitals  /69 (BP Location: Right arm, Patient Position: Lying)   Pulse 53   Temp 36.6 °C (97.9 °F) (Temporal)   Resp 20        Lab Results   Component Value Date    CREATININE 0.57 01/22/2024    CREATININE 0.75 01/21/2024    CREATININE 0.81 01/20/2024    CREATININE 0.75 01/19/2024      Patient weight = 90 kg  BMI = 29.39 kg/m2    Assessment/Plan     Patient will not be given a loading dose.  Will initiate vancomycin maintenance,  1250 mg every 12 hours.    This dosing regimen is predicted by [employer] to result in the following pharmacokinetic parameters:  AUC24,ss: 458 mg/L.hr  Probability of AUC24 > 400: 64 %  Ctrough,ss: 13.5 mg/L  Probability of Ctrough,ss > 20: 21 %  Probability of nephrotoxicity (Lodise LUZ 2009): 9 %    Follow-up level will be ordered on 1/23 with mid-AM labs unless clinically indicated sooner.  Will continue to monitor renal function daily while on vancomycin and order serum creatinine at least every 48 hours if not already ordered.  Will follow for continued vancomycin needs, clinical response, and signs/symptoms of toxicity.     Please call with any questions.  Carmen Miller, PharmD, BCCCP  [id number]

## 2024-01-22 NOTE — CARE PLAN
The patient's goals for the shift include safety.     The clinical goals for the shift include patient's skin remain itch free.

## 2024-01-22 NOTE — PROGRESS NOTES
Noted that facility needed updated therapy notes for insurance approval. Updated notes sent over per discharge support. Currently insurance approval is pending.

## 2024-01-22 NOTE — PROGRESS NOTES
Physical Therapy    Physical Therapy Treatment    Patient Name: Raul Jules  MRN: [id number]  Today's Date: 1/22/2024  Time Calculation  Start Time: 1305  Stop Time: 1332  Time Calculation (min): 27 min       Assessment/Plan   PT Assessment  End of Session Communication: Bedside nurse  End of Session Patient Position: Up in chair, Alarm on     PT Plan  PT Plan: Skilled PT  PT Frequency: 3 times per week  PT Discharge Recommendations: Moderate intensity level of continued care  PT - OK to Discharge: Yes      General Visit Information:   PT  Visit  PT Received On: 01/22/24  General  Prior to Session Communication: Bedside nurse, Physician  Patient Position Received: Bed, 2 rail up, Alarm on    Subjective   Precautions:  Precautions  Medical Precautions: Fall precautions  Precautions Comment:  (treatment performed with contact plus precautions for scabies (though pt cleared from isolation during tx per physician))       Objective      Treatments:  Bed Mobility  Bed Mobility:  (sup > sit with SBA)    Ambulation/Gait Training  Ambulation/Gait Training Performed:  (pt ambulates 4 ft bed > chair followed by 6 ft x 2 chair <> bathroom, no AD and CGA to min A.  pt also able to maintain static/dynamic standing ~2 min. at sink while performing hygiene activities.  during ambulation, pt unsteady and reaching out for objects for support. pt refused attempt at using FWW for support. )    Transfers  Transfer:  (sit <> stand x 3 trials with CGA.  pt moves somewhat quickly and impulsively.)    Outcome Measures:  [facility] Basic Mobility  Turning from your back to your side while in a flat bed without using bedrails: A little  Moving from lying on your back to sitting on the side of a flat bed without using bedrails: A little  Moving to and from bed to chair (including a wheelchair): A little  Standing up from a chair using your arms (e.g. wheelchair or bedside chair): A little  To walk in hospital room: A little  Climbing 3-5 steps with  railing: A lot  Basic Mobility - Total Score: 17    Education Documentation  Mobility Training, taught by Funmilayo Balbuena PTA at 1/22/2024  2:07 PM.  Learner: Patient  Readiness: Acceptance  Method: Explanation  Response: Verbalizes Understanding, Needs Reinforcement    Education Comments  No comments found.        EDUCATION:       Encounter Problems       Encounter Problems (Active)       PT Problem       bed mob (Progressing)       Start:  01/19/24    Expected End:  02/09/24       Ind bed mob         blue (Progressing)       Start:  01/19/24    Expected End:  02/09/24       SBA/min assist blue         gait (Progressing)       Start:  01/19/24    Expected End:  02/09/24       Amb 10 ft> wh walker w/ SBA to min assist         There ex. (Progressing)       Start:  01/19/24    Expected End:  02/09/24       10-30 reps LE's

## 2024-01-22 NOTE — CARE PLAN
The patient's goals for the shift include      The clinical goals for the shift include control itchy of skin      Problem: Pain  Goal: Takes deep breaths with improved pain control throughout the shift  Outcome: Progressing  Goal: Walks with improved pain control throughout the shift  Outcome: Progressing  Goal: Performs ADL's with improved pain control throughout shift  Outcome: Progressing  Goal: Participates in PT with improved pain control throughout the shift  Outcome: Progressing  Goal: Free from opioid side effects throughout the shift  Outcome: Progressing  Goal: Free from acute confusion related to pain meds throughout the shift  Outcome: Progressing     Problem: Fall/Injury  Goal: Be free from injury by end of the shift  Outcome: Progressing  Goal: Verbalize understanding of personal risk factors for fall in the hospital  Outcome: Progressing  Goal: Verbalize understanding of risk factor reduction measures to prevent injury from fall in the home  Outcome: Progressing  Goal: Use assistive devices by end of the shift  Outcome: Progressing  Goal: Pace activities to prevent fatigue by end of the shift  Outcome: Progressing

## 2024-01-22 NOTE — NURSING NOTE
Offered patient a shower per infection doctor's doctor order.  Patient stated he needed to rest first. He would call when he ready.    Informed patient that he needed the new IV access for the steroid and antibiotic.  Patient would like the new IV starting after his shower.

## 2024-01-22 NOTE — PROGRESS NOTES
Occupational Therapy    OT Treatment    Patient Name: Raul Jules  MRN: [id number]  Today's Date: 1/22/2024  Time Calculation  Start Time: 1301  Stop Time: 1325  Time Calculation (min): 24 min         Assessment:        Plan:  Treatment Interventions: ADL retraining, Functional transfer training, UE strengthening/ROM, Endurance training, Neuromuscular reeducation, Fine motor coordination activities, Compensatory technique education  OT Frequency: 3 times per week  OT Discharge Recommendations: Moderate intensity level of continued care  OT - OK to Discharge: Yes (from an O.T. standpoint)  Treatment Interventions: ADL retraining, Functional transfer training, UE strengthening/ROM, Endurance training, Neuromuscular reeducation, Fine motor coordination activities, Compensatory technique education    Subjective   Previous Visit Info:          Objective       Bed Mobility/Transfers: Bed Mobility  Bed Mobility:  (pt. able to move supine to sit with sba.)    Transfers  Transfer:  (pt. able to complete bed to bathroom to chair transfer with cga.  pt. did not want to use a walker for transfers.)    Standing Balance:  Dynamic Standing Balance  Dynamic Standing-Balance:  (pt. tolerated 3.0 min. of dynamic standing balance with  cga while completing grooming tasks.)      Outcome Measures:[facility] Daily Activity  Putting on and taking off regular lower body clothing: A little  Bathing (including washing, rinsing, drying): A little  Putting on and taking off regular upper body clothing: A little  Toileting, which includes using toilet, bedpan or urinal: A little  Taking care of personal grooming such as brushing teeth: A little  Eating Meals: None  Daily Activity - Total Score: 19        Education Documentation  No documentation found.  Education Comments  No comments found.        OP EDUCATION:       Goals:  Encounter Problems       Encounter Problems (Active)       OT Goals       Tolerate 10-15 minutes UE therex within ROM  limitations to promote increased activity tolerance for ADLs (Progressing)       Start:  01/19/24    Expected End:  02/02/24            Increase grooming & UE dressing to SBA  (Progressing)       Start:  01/19/24    Expected End:  02/02/24            Increase LE dressing to min assist with adaptive equipment prn (Progressing)       Start:  01/19/24    Expected End:  02/02/24            Increase functional transfers to/from bed, chair & commode to SBA with DME for safety (Progressing)       Start:  01/19/24    Expected End:  02/02/24            Demonstrate compliance to compensatory techs and safety techs during ADLs (Progressing)       Start:  01/19/24    Expected End:  02/02/24

## 2024-01-22 NOTE — CONSULTS
Consults  Referred by Dr. Roger Ruano MD: No Assigned PCP Generic Provider, MD    Reason For Consult  Concern for scabies    History Of Present Illness  Raul Jules is a 58 y.o. male with a past medical history of chronic severe dermatitis (possibly autoimmune), eczema, asthma, history of TBI, viral conjunctivitis, and COPD (no recent PFTs disease, last done in 2018, not on oxygen) who presents to [facility] with significant skin inflammation.  Reviewed patient's chart indicates he was evaluated in December with a similar episode and possible scabies exposure.  Patient has extensive rash all over his face says he has been having this issue particularly.  Time it is recently gotten worse.  He says he has been evaluated by dermatology at [facility] in the past and was treated several years ago with improvement of his symptoms.  Patient says that he was attempting to get to [facility] but accidentally antibiotic,.  Is requesting be transferred so he can be treated by a dermatologist.      Patient has been hemodynamically stable.  Labs significant for elevated monocytes on admission with leukocytosis, leukocytosis since improved.  Patient was treated with 60 mg prednisone oral 2 days, 1 day 40 mg prednisone.  Patient is was also given permethrin on 1/19 at roughly 6 PM, okay to, to contact precautions.     Past Medical History  -As listed above    Surgical History  -As listed above     Social History     Occupational History    Not on file   Tobacco Use    Smoking status: Never    Smokeless tobacco: Never   Vaping Use    Vaping Use: Not on file   Substance and Sexual Activity    Alcohol use: Never    Drug use: Defer    Sexual activity: Defer     Travel History   Travel since 12/22/23    No documented travel since 12/22/23            Pets: unknown  Hobbies: unknown    Family History  No family history on file.  Allergies  Patient has no known allergies.       There is no immunization  history on file for this patient.  Medications  Home medications:  Medications Prior to Admission   Medication Sig Dispense Refill Last Dose    ALPRAZolam XR (Xanax XR) 0.5 mg 24 hr tablet Take 1 tablet (0.5 mg) by mouth once daily in the morning. Do not crush, chew, or split.   Unknown    calcium carbonate-vitamin D3 500 mg-5 mcg (200 unit) tablet Take 1 tablet by mouth once daily.   Unknown    levalbuterol (Xopenex) 45 mcg/actuation inhaler Inhale 1-2 puffs every 6 hours if needed for wheezing.   Unknown    loratadine (Claritin) 10 mg tablet Take 1 tablet (10 mg) by mouth once daily.   Unknown    losartan (Cozaar) 50 mg tablet Take 2 tablets (100 mg) by mouth once daily.   Unknown    mometasone-formoterol (Dulera) 200-5 mcg/actuation inhaler Inhale 2 puffs 2 times a day. Rinse mouth with water after use to reduce aftertaste and incidence of candidiasis. Do not swallow.   Unknown    oxyCODONE-acetaminophen (Percocet) 5-325 mg tablet Take 1 tablet by mouth every 8 hours if needed for severe pain (7 - 10).   Unknown    pantoprazole (ProtoNix) 40 mg EC tablet Take 20 mg by mouth once daily in the morning. Take before meals. Do not crush, chew, or split.   Unknown    prednisoLONE acetate (Pred-Forte) 1 % ophthalmic suspension Administer 1 drop into both eyes 4 times a day.   Unknown    valACYclovir (Valtrex) 500 mg tablet Take 1 tablet (500 mg) by mouth 2 times a day.   Unknown     Current medications:  Scheduled medications  ALPRAZolam, 0.5 mg, oral, Daily  amLODIPine, 10 mg, oral, Daily  calcium carbonate-vitamin D3, 1 tablet, oral, Daily  fluticasone furoate-vilanteroL, 1 puff, inhalation, Daily  loratadine, 10 mg, oral, Daily  losartan, 100 mg, oral, Daily  methylPREDNISolone sodium succinate (PF), 40 mg, intravenous, q8h  mineral oil-hydrophilic petrolatum, , Topical, BID  pantoprazole, 20 mg, oral, Daily before breakfast  prednisoLONE acetate, 1 drop, Both Eyes, 4x daily  vancomycin, 1,250 mg, intravenous,  q12h      Continuous medications     PRN medications  PRN medications: acetaminophen, diphenhydrAMINE, diphenhydrAMINE, artificial tears, oxyCODONE-acetaminophen    Review of Systems  10 point comprehensive review of systems negative as otherwise indicated in HPI  Objective  Range of Vitals (last 24 hours)  Heart Rate:  []   Temp:  [36.2 °C (97.2 °F)-36.6 °C (97.9 °F)]   Resp:  [17-20]   BP: (125-133)/(69-88)   SpO2:  [93 %-97 %]   Daily Weight  01/19/24 : 90 kg (198 lb 6.6 oz)    Body mass index is 29.39 kg/m².     Physical Exam  General: Alert and cooperative  Respiratory: Patient breathing comfortably, symmetric chest expansion  Gastrointestinal: Nontender to palpation, soft  Neurological: No gross focal neurologic deficit appreciated, patient able to communicate appropriately  Skin: Patient has erythema all over his body, he has significant dryness of his bilateral upper extremities with flaking of the skin.  Is also present over patient's face.  Patient has hives present lower extremities that blanches on palpation.    Relevant Results  Outside Hospital Results  No  Labs  Results from last 72 hours   Lab Units 01/22/24  0658 01/21/24  0647 01/20/24  0710   WBC AUTO x10*3/uL 10.9 13.5* 11.4*   HEMOGLOBIN g/dL 15.1 14.8 14.9   HEMATOCRIT % 45.7 47.2 44.4   PLATELETS AUTO x10*3/uL 383 393 395     Results from last 72 hours   Lab Units 01/22/24  0658 01/21/24  0647 01/20/24  0710   SODIUM mmol/L 137 139 140   POTASSIUM mmol/L 4.1 4.0 4.0   CHLORIDE mmol/L 104 104 107   CO2 mmol/L 25 27 24   BUN mg/dL 17 21 18   CREATININE mg/dL 0.57 0.75 0.81   GLUCOSE mg/dL 91 80 91   CALCIUM mg/dL 8.6 8.9 9.2   ANION GAP mmol/L 12 12 13   EGFR mL/min/1.73m*2 >90 >90 >90         Estimated Creatinine Clearance: 125 mL/min (by C-G formula based on SCr of 0.57 mg/dL).  C-Reactive Protein   Date Value Ref Range Status   01/18/2024 0.69 <1.00 mg/dL Final     Sedimentation Rate   Date Value Ref Range Status   01/18/2024 10 0 - 20  "mm/h Final     HIV 1/2 Antigen/Antibody Screen with Reflex to Confirmation   Date Value Ref Range Status   01/19/2024 Nonreactive Nonreactive Final     No results found for: \"HEPCABINIT\", \"HEPCAB\", \"HCVPCRQUANT\"  Microbiology  No results found for the last 90 days.    Imaging      CT BRAIN WO IVCON    Result Date: 1/11/2024  * * *Final Report* * * DATE OF EXAM: Jan 11 2024  3:25PM   [facility]   0504  -  CT BRAIN WO IVCON   / ACCESSION #  [id number] PROCEDURE REASON: Mental status change, unknown cause      * * * * Physician Interpretation * * * *  EXAMINATION: CT BRAIN WO IVCON CLINICAL HISTORY: Mental status change, unknown cause TECHNIQUE:  Serial axial images without IV contrast were obtained from the vertex to the foramen magnum. MQ:  CTBWO_3 CT Radiation dose: Integrated Dose-Length Product (DLP) for this visit =   770.97  mGy*cm CT Dose Reduction Employed: Iterative recon COMPARISON: None. RESULT: Post-operative change: None. Acute change: No evidence of an acute infarct or other acute parenchymal process. Hemorrhage: No evidence of acute intracranial hemorrhage. ECASS hemorrhagic transformation score: Not Applicable Mass Lesion / Mass Effect: There is no evidence of an intracranial mass or extraaxial fluid collection.  No significant mass effect. Chronic change: Bilateral inferior frontal and temporal encephalomalacia/gliosis. Mild chronic small vessel ischemic change Parenchyma: Mild generalized volume loss  The brain parenchyma is otherwise within normal limits for age. Ventricles: The ventricles are within normal limits of size and configuration for age. Paranasal sinuses and skull base: The visualized paranasal sinuses are grossly clear.   An approximately 4.5 x 1.6 cm x 5 cm (mediolateral by anteroposterior by superoinferior dimensions respectively) subcutaneous fatty attenuation lesion in the left occipital region compatible with a lipoma  (topogram) images: No acute findings    IMPRESSION: No acute " intracranial process. Bilateral frontal and temporal encephalomalacia/gliosis likely representing the sequela of remote trauma. Incidental left occipital subcutaneous lipoma : PSCB   Transcribe Date/Time: Jan 11 2024  3:30P Dictated by : MAREK MARTINEZ MD This examination was interpreted and the report reviewed and electronically signed by: MAREK MARTINEZ MD on Jan 11 2024  3:34PM  EST    XR CHEST 2V FRONTAL/LAT    Result Date: 1/11/2024  * * *Final Report* * * DATE OF EXAM: Jan 11 2024  2:54PM   RH[facility]   5291  -  XR CHEST 2V FRONTAL/LAT  / ACCESSION #  [id number] PROCEDURE REASON: Other      * * * * Physician Interpretation * * * *  EXAMINATION:  CHEST RADIOGRAPH (2 VIEW FRONTAL & LATERAL) CLINICAL HISTORY: Other, Generalized weakness MQ:  XC2_6 EXAM DATE/TIME:  1/11/2024 2:54 PM COMPARISON:  No relevant prior studies available. RESULT: Lines, tubes, and devices:  None. Lungs and pleura:  No consolidation. No lung mass. No pleural effusion. No pneumothorax. Cardiomediastinal silhouette:  Normal cardiomediastinal silhouette. Bones and soft tissues:  Mild dextrocurvature of the spine.    IMPRESSION: No acute radiographic abnormality. : DARREN   Transcribe Date/Time: Jan 11 2024  2:57P Dictated by : CHRIS SNOW MD This examination was interpreted and the report reviewed and electronically signed by: CHRIS SNOW MD on Jan 11 2024  2:57PM  EST           Assessment/Plan   #Severe chronic dermatitis  #Concern for superimposed cellulitis  -Patient has completed permethrin over 24 hours ago can come out of contact precautions  -Start patient on Solu-Medrol 40 mg every 8 hours IV  -Patient has a plan to follow with dermatology outpatient  -Patient requesting take a shower although this is okay cannot use any scented soaps which may exacerbate his skin condition  -Will start patient on vancomycin.  -Patient's HIV testing was negative  -Will need permethrin again on Friday    Multisystem  medical conditions:  #GERD  #Hypertension  #Hyperlipidemia  #COPD  #TBI  #Failure to thrive  -Management per primary team.    Xenia Kuo MD

## 2024-01-23 LAB — VANCOMYCIN SERPL-MCNC: 5.1 UG/ML (ref 5–20)

## 2024-01-23 PROCEDURE — 2500000004 HC RX 250 GENERAL PHARMACY W/ HCPCS (ALT 636 FOR OP/ED): Performed by: NURSE PRACTITIONER

## 2024-01-23 PROCEDURE — 96365 THER/PROPH/DIAG IV INF INIT: CPT | Mod: 59

## 2024-01-23 PROCEDURE — 36415 COLL VENOUS BLD VENIPUNCTURE: CPT | Performed by: INTERNAL MEDICINE

## 2024-01-23 PROCEDURE — 96375 TX/PRO/DX INJ NEW DRUG ADDON: CPT

## 2024-01-23 PROCEDURE — 80202 ASSAY OF VANCOMYCIN: CPT | Performed by: INTERNAL MEDICINE

## 2024-01-23 PROCEDURE — 2500000001 HC RX 250 WO HCPCS SELF ADMINISTERED DRUGS (ALT 637 FOR MEDICARE OP): Performed by: INTERNAL MEDICINE

## 2024-01-23 PROCEDURE — 2500000001 HC RX 250 WO HCPCS SELF ADMINISTERED DRUGS (ALT 637 FOR MEDICARE OP): Performed by: NURSE PRACTITIONER

## 2024-01-23 PROCEDURE — G0378 HOSPITAL OBSERVATION PER HR: HCPCS

## 2024-01-23 PROCEDURE — 99239 HOSP IP/OBS DSCHRG MGMT >30: CPT | Performed by: INTERNAL MEDICINE

## 2024-01-23 PROCEDURE — 2500000004 HC RX 250 GENERAL PHARMACY W/ HCPCS (ALT 636 FOR OP/ED)

## 2024-01-23 RX ORDER — PREDNISONE 10 MG/1
TABLET ORAL
Qty: 30 TABLET | Refills: 0 | Status: SHIPPED | OUTPATIENT
Start: 2024-01-23 | End: 2024-02-04

## 2024-01-23 RX ORDER — DOXYCYCLINE HYCLATE 100 MG
100 TABLET ORAL EVERY 12 HOURS SCHEDULED
Status: DISCONTINUED | OUTPATIENT
Start: 2024-01-23 | End: 2024-01-28 | Stop reason: HOSPADM

## 2024-01-23 RX ORDER — DIPHENHYDRAMINE HCL 25 MG
25 CAPSULE ORAL EVERY 6 HOURS PRN
Qty: 30 CAPSULE | Refills: 0
Start: 2024-01-23

## 2024-01-23 RX ORDER — DOXYCYCLINE 100 MG/1
100 CAPSULE ORAL 2 TIMES DAILY
Qty: 28 CAPSULE | Refills: 0
Start: 2024-01-23 | End: 2024-02-06

## 2024-01-23 RX ORDER — PREDNISONE 20 MG/1
60 TABLET ORAL DAILY
Status: DISCONTINUED | OUTPATIENT
Start: 2024-01-24 | End: 2024-01-28 | Stop reason: HOSPADM

## 2024-01-23 RX ORDER — PERMETHRIN 50 MG/G
CREAM TOPICAL ONCE
Qty: 60 G | Refills: 0 | Status: SHIPPED | OUTPATIENT
Start: 2024-01-26 | End: 2024-01-26

## 2024-01-23 RX ADMIN — DIPHENHYDRAMINE HYDROCHLORIDE 25 MG: 25 CAPSULE ORAL at 17:21

## 2024-01-23 RX ADMIN — PANTOPRAZOLE SODIUM 20 MG: 20 TABLET, DELAYED RELEASE ORAL at 06:31

## 2024-01-23 RX ADMIN — ALPRAZOLAM 0.5 MG: 0.25 TABLET ORAL at 09:47

## 2024-01-23 RX ADMIN — LORATADINE 10 MG: 10 TABLET ORAL at 09:47

## 2024-01-23 RX ADMIN — Medication 1 TABLET: at 09:47

## 2024-01-23 RX ADMIN — VANCOMYCIN HYDROCHLORIDE 1.25 G: 1.25 INJECTION, POWDER, LYOPHILIZED, FOR SOLUTION INTRAVENOUS at 12:18

## 2024-01-23 RX ADMIN — OXYCODONE HYDROCHLORIDE AND ACETAMINOPHEN 1 TABLET: 5; 325 TABLET ORAL at 23:45

## 2024-01-23 RX ADMIN — METHYLPREDNISOLONE SODIUM SUCCINATE 40 MG: 40 INJECTION, POWDER, FOR SOLUTION INTRAMUSCULAR; INTRAVENOUS at 04:58

## 2024-01-23 RX ADMIN — LOSARTAN POTASSIUM 100 MG: 50 TABLET, FILM COATED ORAL at 09:47

## 2024-01-23 RX ADMIN — AMLODIPINE BESYLATE 10 MG: 10 TABLET ORAL at 09:47

## 2024-01-23 RX ADMIN — OXYCODONE HYDROCHLORIDE AND ACETAMINOPHEN 1 TABLET: 5; 325 TABLET ORAL at 10:34

## 2024-01-23 RX ADMIN — DOXYCYCLINE HYCLATE 100 MG: 100 TABLET, COATED ORAL at 20:15

## 2024-01-23 RX ADMIN — METHYLPREDNISOLONE SODIUM SUCCINATE 40 MG: 40 INJECTION, POWDER, FOR SOLUTION INTRAMUSCULAR; INTRAVENOUS at 13:42

## 2024-01-23 RX ADMIN — PREDNISOLONE ACETATE 1 DROP: 10 SUSPENSION/ DROPS OPHTHALMIC at 06:31

## 2024-01-23 ASSESSMENT — COGNITIVE AND FUNCTIONAL STATUS - GENERAL
DAILY ACTIVITIY SCORE: 24
MOVING FROM LYING ON BACK TO SITTING ON SIDE OF FLAT BED WITH BEDRAILS: A LITTLE
WALKING IN HOSPITAL ROOM: A LITTLE
MOBILITY SCORE: 18
CLIMB 3 TO 5 STEPS WITH RAILING: A LITTLE
MOVING TO AND FROM BED TO CHAIR: A LITTLE
TURNING FROM BACK TO SIDE WHILE IN FLAT BAD: A LITTLE
STANDING UP FROM CHAIR USING ARMS: A LITTLE

## 2024-01-23 ASSESSMENT — PAIN DESCRIPTION - LOCATION: LOCATION: GENERALIZED

## 2024-01-23 ASSESSMENT — PAIN - FUNCTIONAL ASSESSMENT
PAIN_FUNCTIONAL_ASSESSMENT: 0-10
PAIN_FUNCTIONAL_ASSESSMENT: 0-10

## 2024-01-23 ASSESSMENT — PAIN SCALES - GENERAL
PAINLEVEL_OUTOF10: 6
PAINLEVEL_OUTOF10: 0 - NO PAIN
PAINLEVEL_OUTOF10: 0 - NO PAIN

## 2024-01-23 NOTE — PROGRESS NOTES
Raul Jules is a 58 y.o. male on day 0 of admission presenting with General symptom.      Subjective   No events overnight.  Patient seen and examined at bedside.  His skin is improved. Discussed with ID as well.       Objective     Last Recorded Vitals  /60 (BP Location: Left arm, Patient Position: Lying)   Pulse 105   Temp 36.8 °C (98.2 °F) (Temporal)   Resp 20   Wt 90 kg (198 lb 6.6 oz)   SpO2 95%   Intake/Output last 3 Shifts:    Intake/Output Summary (Last 24 hours) at 1/22/2024 2158  Last data filed at 1/22/2024 0900  Gross per 24 hour   Intake 500 ml   Output --   Net 500 ml       Admission Weight  Weight: 83.9 kg (185 lb) (01/18/24 1242)    Daily Weight  01/19/24 : 90 kg (198 lb 6.6 oz)    Image Results  No image results found.      Physical Exam  HENT:      Mouth/Throat:      Mouth: Mucous membranes are moist.      Pharynx: Oropharynx is clear.   Eyes:      Pupils: Pupils are equal, round, and reactive to light.   Cardiovascular:      Rate and Rhythm: Normal rate and regular rhythm.   Pulmonary:      Effort: Pulmonary effort is normal.      Breath sounds: Normal breath sounds.   Abdominal:      General: Bowel sounds are normal.      Palpations: Abdomen is soft.   Musculoskeletal:         General: Normal range of motion.      Cervical back: Normal range of motion.   Skin:     General: Skin is warm.      Comments: Scaly, dry, cracking rash on face/hands/feet, elbows, and chest   Neurological:      General: No focal deficit present.      Mental Status: He is alert and oriented to person, place, and time.   Psychiatric:         Mood and Affect: Mood normal.         Behavior: Behavior normal.      Relevant Results               Assessment/Plan        Principal Problem:    General symptom    Raul is a 58-year-old male patient who is alert and oriented x 3 presenting to emergency department with a rash.  Patient states he has had this rash for the last several years but worsening over the last few  weeks.  Patient states he had a rash like this years ago and followed up with a dermatologist, was prescribed medications which he never took.  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  Patient denies chest pain or dizziness.  Patient has a scaly, dry, cracking rash on his face, hands, feet, chest, and bilateral elbows.  Patient medicated with Benadryl and prednisone p.o.  Patient admitted for further medical management.     Rash/failure to thrive  Suspected scabies  Head lice  Admit to observation per Dr. Roger Lacey  Prednisone p.o. daily  Benadryl x 2 IV in ED  Continue IV fluids  Urinalysis pending  Labs unremarkable  Aquaphor cream as needed  PT/OT  Social work consult for discharge planning  Repeat labs in am  Permethrin cream for skin  Permethrin liquid for scalp  ID consulted, appreciate recs  HIV screening per ID recs     GERD/hypertension/hyperlipidemia/COPD/eczema/HSV of the eye/TBI/anxiety  Continue home medications  Cardiac diet  IV Metoprolol X 1 for htn     DVT Ppx  SCDs  Activity as tolerated     1/20: Patient is status post treatment with permethrin for suspected scabies and head lice.  He has pruritus and formication have improved.  Blood pressures remain elevated, increase amlodipine no started yesterday.  Patient medically ready for discharge to SNF.    1/21: HIV negative.  Awaiting pre-[facility] nursing facility.  Outpatient dermatology follow-up.    1/22: Steroids changed to solumedrol. Continue moisturizers. Patient ok to shower with unscented soap. Started on Vancomycin. Permethrin again Friday. Awaiting precert to SNF.              Roger Lacey DO

## 2024-01-23 NOTE — PROGRESS NOTES
"TCC Note: Noticed that precert was obtained by [facility] yesterday afternoon. Pt has written discharge to go today. Asked RN if ID still needed to see. RN checking and will update me with answer. Will set up transport for today. Pt will need a wheelchair and Room Air. Will follow to set up transport time. Thais Camargo, RAMAN RN, TCC.    ADDENDUM: Received a call from [facility] who informed me that this patient had called them and was on Hold and pt was stating to insurance company that he wanted to go to a \"Dermatology Hospital\". Informed [facility] that pt has a written discharge and can admit to a SNF today pending ID signing off. Informed them that I never heard of a \"Dermatology Hospital\" but would be more than happy to speak with pt regarding discharge plan. Still waiting for ID to see pt and give discharge plan from their aspect. Will follow. Thais Camargo, RAMAN, RN, TCC.  "

## 2024-01-23 NOTE — CARE PLAN
The patient's goals for the shift include  skin relief from dermatitis    The clinical goals for the shift include control itchy of skin

## 2024-01-23 NOTE — PROGRESS NOTES
Vancomycin Dosing by Pharmacy- FOLLOW UP    Raul Jules is a 58 y.o. year old male who Pharmacy has been consulted for vancomycin dosing for cellulitis, skin and soft tissue. Based on the patient's indication and renal status this patient is being dosed based on a goal AUC of 400-600.     Renal function is currently stable.    Current vancomycin dose: 1250 mg given every 12 hours    Estimated vancomycin AUC on current dose: 442 mg/L.hr     Visit Vitals  /68   Pulse 103   Temp 36.7 °C (98.1 °F)   Resp 16        Lab Results   Component Value Date    CREATININE 0.57 01/22/2024    CREATININE 0.75 01/21/2024    CREATININE 0.81 01/20/2024    CREATININE 0.75 01/19/2024     I/O last 3 completed shifts:  In: 500 (6 mL/kg) [P.O.:500]  Out: - (0 mL/kg)   Dosing Weight: 83.9 kg       Assessment/Plan    Day #2 of vancomycin therapy.   Within goal AUC range. Continue current vancomycin regimen.    This dosing regimen is predicted by [employer] to result in the following pharmacokinetic parameters:  AUC24,ss: 442 mg/L.hr  Probability of AUC24 > 400: 66 %  Ctrough,ss: 13 mg/L  Probability of Ctrough,ss > 20: 11 %  Probability of nephrotoxicity (Lodise LUZ 2009): 8 %    The next level will be obtained on 1/26 with AM labs. May be obtained sooner if clinically indicated.   Will continue to monitor renal function daily while on vancomycin and order serum creatinine at least every 48 hours if not already ordered.  Will follow for continued vancomycin needs, clinical response, and signs/symptoms of toxicity.     Please call with any questions.  Carmen Miller, PharmD, BCCCP  [phone]

## 2024-01-23 NOTE — CARE PLAN
The patient's goals for the shift include      The clinical goals for the shift include Contrl itchy and red skin    Over the shift, the patient did not make progress toward the following goals. Barriers to progression include acute illness. Recommendations to address these barriers include communication.

## 2024-01-23 NOTE — DISCHARGE SUMMARY
Discharge Diagnosis  General symptom    Issues Requiring Follow-Up  Scabies, dermatitis    Test Results Pending At Discharge  Pending Labs       No current pending labs.            Hospital Course   Raul is a 58-year-old male patient who is alert and oriented x 3 presenting to emergency department with a rash.  Patient states he has had this rash for the last several years but worsening over the last few weeks.  Patient states he had a rash like this years ago and followed up with a dermatologist, was prescribed medications which he never took.  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  Patient denies chest pain or dizziness.  Patient has a scaly, dry, cracking rash on his face, hands, feet, chest, and bilateral elbows.  Patient medicated with Benadryl and prednisone p.o.  Patient admitted for further medical management.     Rash/failure to thrive  Suspected scabies  Head lice  Admit to observation per Dr. Roger Lacey  Prednisone p.o. daily  Benadryl x 2 IV in ED  Continue IV fluids  Urinalysis pending  Labs unremarkable  Aquaphor cream as needed  PT/OT  Social work consult for discharge planning  Repeat labs in am  Permethrin cream for skin  Permethrin liquid for scalp  ID consulted, appreciate recs  HIV screening per ID recs     GERD/hypertension/hyperlipidemia/COPD/eczema/HSV of the eye/TBI/anxiety  Continue home medications  Cardiac diet  IV Metoprolol X 1 for htn     DVT Ppx  SCDs  Activity as tolerated     1/20: Patient is status post treatment with permethrin for suspected scabies and head lice.  He has pruritus and formication have improved.  Blood pressures remain elevated, increase amlodipine no started yesterday.  Patient medically ready for discharge to SNF.     1/21: HIV negative.  Awaiting pre-[facility] nursing facility.  Outpatient dermatology follow-up.     1/22: Steroids changed to solumedrol. Continue moisturizers. Patient ok to shower with unscented soap. Started on Vancomycin.  Permethrin again Friday. Awaiting precert to SNF.     1/23: Patient discharged to SNF. He will have a prednisone taper and outpatient Dermatology appointment. He will need Permethrin again on Friday. Antibiotics per ID.    Pertinent Physical Exam At Time of Discharge  Physical Exam  HENT:      Mouth/Throat:      Mouth: Mucous membranes are moist.      Pharynx: Oropharynx is clear.   Eyes:      Pupils: Pupils are equal, round, and reactive to light.   Cardiovascular:      Rate and Rhythm: Normal rate and regular rhythm.   Pulmonary:      Effort: Pulmonary effort is normal.      Breath sounds: Normal breath sounds.   Abdominal:      General: Bowel sounds are normal.      Palpations: Abdomen is soft.   Musculoskeletal:         General: Normal range of motion.      Cervical back: Normal range of motion.   Skin:     General: Skin is warm.      Comments: Scaly, dry, cracking rash on face/hands/feet, elbows, and chest   Neurological:      General: No focal deficit present.      Mental Status: He is alert and oriented to person, place, and time.   Psychiatric:         Mood and Affect: Mood normal.         Behavior: Behavior normal.      Home Medications     Medication List      START taking these medications     amLODIPine 10 mg tablet; Commonly known as: Norvasc; Take 1 tablet (10   mg) by mouth once daily. Do not start before January 21, 2024.   diphenhydrAMINE 25 mg capsule; Commonly known as: BENADryl; Take 1   capsule (25 mg) by mouth every 6 hours if needed for itching.   lubricating eye drops ophthalmic solution; Administer 2 drops into both   eyes 3 times a day as needed for dry eyes.   mineral oil-hydrophilic petrolatum ointment; Commonly known as:   Aquaphor; Apply topically 2 times a day.   permethrin 5 % cream; Commonly known as: Elimite; Apply topically 1 time   for 1 dose. Apply to skin from hairline to toes and wash off 8-10 hours   later. Do not start before January 26, 2024.; Start taking on: January 26,    2024   predniSONE 10 mg tablet; Commonly known as: Deltasone; Take 4 tablets   (40 mg) by mouth once daily for 3 days, THEN 3 tablets (30 mg) once daily   for 3 days, THEN 2 tablets (20 mg) once daily for 3 days, THEN 1 tablet   (10 mg) once daily for 3 days.; Start taking on: January 23, 2024     CONTINUE taking these medications     ALPRAZolam XR 0.5 mg 24 hr tablet; Commonly known as: Xanax XR   calcium carbonate-vitamin D3 500 mg-5 mcg (200 unit) tablet   Dulera 200-5 mcg/actuation inhaler; Generic drug: mometasone-formoterol   loratadine 10 mg tablet; Commonly known as: Claritin   losartan 50 mg tablet; Commonly known as: Cozaar   oxyCODONE-acetaminophen 5-325 mg tablet; Commonly known as: Percocet   pantoprazole 40 mg EC tablet; Commonly known as: ProtoNix   prednisoLONE acetate 1 % ophthalmic suspension; Commonly known as:   Pred-Forte     STOP taking these medications     levalbuterol 45 mcg/actuation inhaler; Commonly known as: Xopenex   valACYclovir 500 mg tablet; Commonly known as: Valtrex       Outpatient Follow-Up  No future appointments.    Roger Lacey DO

## 2024-01-23 NOTE — PROGRESS NOTES
Raul Jules is a 58 y.o. male on day 0 of admission presenting with General symptom.    Subjective   Interval History: Patient was able to take a shower yesterday but unable to use soap because of the stent and the extreme sensitivity of his skin.  Patient is convinced he still has scabies, attempted to reassure the patient that he is already been treated with permethrin.    Review of Systems  10 point comprehensive review of systems negative as otherwise indicated  Objective   Range of Vitals (last 24 hours)  Heart Rate:  []   Temp:  [36.5 °C (97.7 °F)-36.8 °C (98.2 °F)]   Resp:  [16-20]   BP: (105-135)/(60-71)   SpO2:  [95 %-96 %]   Daily Weight  01/19/24 : 90 kg (198 lb 6.6 oz)    Body mass index is 29.39 kg/m².    Physical Exam  General: Alert and cooperative  Respiratory: Patient breathing comfortably, symmetric chest expansion  Gastrointestinal: Nontender to palpation, soft  Neurological: No gross focal neurologic deficit appreciated, patient able to communicate appropriately  Skin: Patient's erythema is improved from prior his skin is drier than yesterday, hives seem improved Post steroids.    Antibiotics  diphenhydrAMINE (BENADryl) injection 50 mg  lubricating eye drops ophthalmic solution 2 drop  predniSONE (Deltasone) tablet 60 mg  acetaminophen (Tylenol) tablet 650 mg  valACYclovir (Valtrex) tablet  losartan (Cozaar) tablet  pantoprazole (ProtoNix) EC tablet  loratadine (Claritin) tablet        diphenhydrAMINE (BENADryl) injection 50 mg    ALPRAZolam (Xanax XR) 24 hr tablet    ALPRAZolam (Xanax) tablet 0.5 mg  calcium carbonate-vitamin D3 500 mg-5 mcg (200 unit) per tablet 1 tablet  loratadine (Claritin) tablet 10 mg  losartan (Cozaar) tablet 100 mg  fluticasone furoate-vilanteroL (Breo Ellipta) 200-25 mcg/dose inhaler 1 puff  oxyCODONE-acetaminophen (Percocet) 5-325 mg per tablet 1 tablet  pantoprazole (ProtoNix) EC tablet 20 mg  prednisoLONE acetate (Pred-Forte) 1 % ophthalmic suspension 1  drop  metoprolol tartrate (Lopressor) injection 5 mg  sodium chloride 0.9% infusion  eucerin cream  mineral oil-hydrophilic petrolatum (Aquaphor) ointment  amLODIPine (Norvasc) tablet 5 mg  predniSONE (Deltasone) tablet 40 mg  permethrin (Elimite) 5 % cream  diphenhydrAMINE (BENADryl) injection 25 mg  permethrin (Nix) 1 % liquid  amLODIPine (Norvasc) tablet 10 mg  amLODIPine (Norvasc) tablet      diphenhydrAMINE (BENADryl) capsule 25 mg  methylPREDNISolone sod succinate (PF) (SOLU-Medrol) 40 mg/mL injection 40 mg  vancomycin 1.25 g in dextrose 5 % 250 mL IV  diphenhydrAMINE (BENADryl) capsule  predniSONE (Deltasone) tablet  Permethrin (Elimite) 5% cream, apply from hairline to toes, #60g, Ref 0  doxycycline (Vibramycin) capsule  doxycycline (Vibra-Tabs) tablet 100 mg  predniSONE (Deltasone) tablet 60 mg      Relevant Results  Labs  Results from last 72 hours   Lab Units 01/22/24  0658 01/21/24  0647   WBC AUTO x10*3/uL 10.9 13.5*   HEMOGLOBIN g/dL 15.1 14.8   HEMATOCRIT % 45.7 47.2   PLATELETS AUTO x10*3/uL 383 393     Results from last 72 hours   Lab Units 01/22/24  0658 01/21/24  0647   SODIUM mmol/L 137 139   POTASSIUM mmol/L 4.1 4.0   CHLORIDE mmol/L 104 104   CO2 mmol/L 25 27   BUN mg/dL 17 21   CREATININE mg/dL 0.57 0.75   GLUCOSE mg/dL 91 80   CALCIUM mg/dL 8.6 8.9   ANION GAP mmol/L 12 12   EGFR mL/min/1.73m*2 >90 >90         Estimated Creatinine Clearance: 125 mL/min (by C-G formula based on SCr of 0.57 mg/dL).  C-Reactive Protein   Date Value Ref Range Status   01/18/2024 0.69 <1.00 mg/dL Final     Microbiology  No results found for the last 14 days.    Imaging              Assessment/Plan     Daily progress:  1/23/2024: Patient okay to come out of contact precautions from ID perspective scabies been treated with permethrin, dermatology service not available at [facility] is unable to do skin scrape biopsy.  Advised patient to continue doxycycline and steroid taper in the outpatient setting and follow with  dermatology for further evaluation.    #Severe chronic dermatitis  #Concern for superimposed cellulitis  -Patient has completed permethrin  can come out of contact precautions  -Steroid taper per primary team  -Patient has a plan to follow with dermatology outpatient  -14-day course of doxycycline   -Patient's HIV testing was negative  -Will need permethrin again on Friday, communicated this to the primary team as well as skilled nursing facility.     Multisystem medical conditions:  #GERD  #Hypertension  #Hyperlipidemia  #COPD  #TBI  #Failure to thrive  -Management per primary team.     MD Xenia Fiore MD

## 2024-01-24 PROCEDURE — 99232 SBSQ HOSP IP/OBS MODERATE 35: CPT | Performed by: INTERNAL MEDICINE

## 2024-01-24 PROCEDURE — G0378 HOSPITAL OBSERVATION PER HR: HCPCS

## 2024-01-24 PROCEDURE — 2500000004 HC RX 250 GENERAL PHARMACY W/ HCPCS (ALT 636 FOR OP/ED): Performed by: INTERNAL MEDICINE

## 2024-01-24 PROCEDURE — 87328 CRYPTOSPORIDIUM AG IA: CPT | Performed by: INTERNAL MEDICINE

## 2024-01-24 PROCEDURE — 87329 GIARDIA AG IA: CPT | Performed by: INTERNAL MEDICINE

## 2024-01-24 PROCEDURE — 2500000001 HC RX 250 WO HCPCS SELF ADMINISTERED DRUGS (ALT 637 FOR MEDICARE OP): Performed by: NURSE PRACTITIONER

## 2024-01-24 PROCEDURE — 2500000001 HC RX 250 WO HCPCS SELF ADMINISTERED DRUGS (ALT 637 FOR MEDICARE OP): Performed by: INTERNAL MEDICINE

## 2024-01-24 PROCEDURE — 2500000004 HC RX 250 GENERAL PHARMACY W/ HCPCS (ALT 636 FOR OP/ED): Performed by: NURSE PRACTITIONER

## 2024-01-24 RX ADMIN — DOXYCYCLINE HYCLATE 100 MG: 100 TABLET, COATED ORAL at 20:09

## 2024-01-24 RX ADMIN — ALPRAZOLAM 0.5 MG: 0.25 TABLET ORAL at 10:29

## 2024-01-24 RX ADMIN — Medication 1 TABLET: at 10:29

## 2024-01-24 RX ADMIN — LORATADINE 10 MG: 10 TABLET ORAL at 10:29

## 2024-01-24 RX ADMIN — Medication: at 09:00

## 2024-01-24 RX ADMIN — OXYCODONE HYDROCHLORIDE AND ACETAMINOPHEN 1 TABLET: 5; 325 TABLET ORAL at 09:02

## 2024-01-24 RX ADMIN — Medication: at 21:00

## 2024-01-24 RX ADMIN — DOXYCYCLINE HYCLATE 100 MG: 100 TABLET, COATED ORAL at 10:29

## 2024-01-24 RX ADMIN — PREDNISONE 60 MG: 20 TABLET ORAL at 10:29

## 2024-01-24 RX ADMIN — OXYCODONE HYDROCHLORIDE AND ACETAMINOPHEN 1 TABLET: 5; 325 TABLET ORAL at 18:31

## 2024-01-24 RX ADMIN — AMLODIPINE BESYLATE 10 MG: 10 TABLET ORAL at 10:30

## 2024-01-24 RX ADMIN — PANTOPRAZOLE SODIUM 20 MG: 20 TABLET, DELAYED RELEASE ORAL at 06:22

## 2024-01-24 RX ADMIN — LOSARTAN POTASSIUM 100 MG: 50 TABLET, FILM COATED ORAL at 10:29

## 2024-01-24 ASSESSMENT — COGNITIVE AND FUNCTIONAL STATUS - GENERAL
DAILY ACTIVITIY SCORE: 24
WALKING IN HOSPITAL ROOM: A LITTLE
TURNING FROM BACK TO SIDE WHILE IN FLAT BAD: A LITTLE
STANDING UP FROM CHAIR USING ARMS: A LITTLE
CLIMB 3 TO 5 STEPS WITH RAILING: A LITTLE
MOBILITY SCORE: 18
MOVING TO AND FROM BED TO CHAIR: A LITTLE
MOVING FROM LYING ON BACK TO SITTING ON SIDE OF FLAT BED WITH BEDRAILS: A LITTLE

## 2024-01-24 ASSESSMENT — PAIN - FUNCTIONAL ASSESSMENT
PAIN_FUNCTIONAL_ASSESSMENT: 0-10
PAIN_FUNCTIONAL_ASSESSMENT: 0-10

## 2024-01-24 ASSESSMENT — PAIN SCALES - GENERAL
PAINLEVEL_OUTOF10: 6
PAINLEVEL_OUTOF10: 8

## 2024-01-24 ASSESSMENT — PAIN DESCRIPTION - ORIENTATION: ORIENTATION: RIGHT;LEFT

## 2024-01-24 NOTE — PROGRESS NOTES
Occupational Therapy                 Therapy Communication Note    Patient Name: Raul Jules  MRN: [id number]  Today's Date: 1/24/2024     Discipline: Occupational Therapy    Missed Visit Reason: Missed Visit Reason: Patient refused    Missed Time: Attempt    Comment: Patient felt very fatigued.

## 2024-01-24 NOTE — PROGRESS NOTES
"Physical Therapy                 Therapy Communication Note    Patient Name: Raul Jules  MRN: [id number]  Today's Date: 1/24/2024     Discipline: Physical Therapy    Missed Visit Reason: Missed Visit Reason: Patient refused (Attempt @1319 pt politely declined stating \"I'm sorry, I'm just so drained. I'm not sleeping. Something is draining me of my energy.\" Despite encouragement pt cont'd to decline therapy. No skilled PT services given. Will continue to see patient per POC as medically able and appropriate.)    Missed Time: Attempt    "

## 2024-01-24 NOTE — CONSULTS
Vancomycin Dosing by Pharmacy- Cessation of Therapy    Consult to pharmacy for vancomycin dosing has been discontinued by the prescriber, pharmacy will sign off at this time.    Please call pharmacy if there are further questions or re-enter a consult if vancomycin is resumed.     Estrellita Nye, BrunoD

## 2024-01-24 NOTE — CARE PLAN
The patient's goals for the shift include  to control itchy skin    The clinical goals for the shift include Remain less itchy during shift

## 2024-01-24 NOTE — PROGRESS NOTES
"Raul Jules is a 58 y.o. male on day 0 of admission presenting with General symptom.      Subjective   No events overnight.  Patient seen and examined at bedside.  His skin is improving still. SNF unable to accept patient until after second permethrin treatment despite being cleared by ID. Patient does not complain of irritation in his rectum. He states he eats more than his \"obese friends\" and does not gain weight. He is concerned about a possible intestinal parasite as well.       Objective     Last Recorded Vitals  /59 (BP Location: Right arm, Patient Position: Lying)   Pulse 83   Temp 36.1 °C (97 °F) (Temporal)   Resp 20   Wt 90 kg (198 lb 6.6 oz)   SpO2 96%   Intake/Output last 3 Shifts:  No intake or output data in the 24 hours ending 01/24/24 1434      Admission Weight  Weight: 83.9 kg (185 lb) (01/18/24 1242)    Daily Weight  01/19/24 : 90 kg (198 lb 6.6 oz)    Image Results  No image results found.      Physical Exam  HENT:      Mouth/Throat:      Mouth: Mucous membranes are moist.      Pharynx: Oropharynx is clear.   Eyes:      Pupils: Pupils are equal, round, and reactive to light.   Cardiovascular:      Rate and Rhythm: Normal rate and regular rhythm.   Pulmonary:      Effort: Pulmonary effort is normal.      Breath sounds: Normal breath sounds.   Abdominal:      General: Bowel sounds are normal.      Palpations: Abdomen is soft.   Musculoskeletal:         General: Normal range of motion.      Cervical back: Normal range of motion.   Skin:     General: Skin is warm.      Comments: Scaly, dry, cracking rash on face/hands/feet, elbows, and chest   Neurological:      General: No focal deficit present.      Mental Status: He is alert and oriented to person, place, and time.   Psychiatric:         Mood and Affect: Mood normal.         Behavior: Behavior normal.      Relevant Results               Assessment/Plan        Principal Problem:    General symptom    Raul is a 58-year-old male patient " who is alert and oriented x 3 presenting to emergency department with a rash.  Patient states he has had this rash for the last several years but worsening over the last few weeks.  Patient states he had a rash like this years ago and followed up with a dermatologist, was prescribed medications which he never took.  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  Patient denies chest pain or dizziness.  Patient has a scaly, dry, cracking rash on his face, hands, feet, chest, and bilateral elbows.  Patient medicated with Benadryl and prednisone p.o.  Patient admitted for further medical management.     Rash/failure to thrive  Suspected scabies  Head lice  Admit to observation per Dr. Roger Lacey  Prednisone p.o. daily  Benadryl x 2 IV in ED  Continue IV fluids  Urinalysis pending  Labs unremarkable  Aquaphor cream as needed  PT/OT  Social work consult for discharge planning  Repeat labs in am  Permethrin cream for skin  Permethrin liquid for scalp  ID consulted, appreciate recs  HIV screening per ID recs     GERD/hypertension/hyperlipidemia/COPD/eczema/HSV of the eye/TBI/anxiety  Continue home medications  Cardiac diet  IV Metoprolol X 1 for htn     DVT Ppx  SCDs  Activity as tolerated     1/20: Patient is status post treatment with permethrin for suspected scabies and head lice.  He has pruritus and formication have improved.  Blood pressures remain elevated, increase amlodipine no started yesterday.  Patient medically ready for discharge to SNF.    1/21: HIV negative.  Awaiting [facility] nursing facility.  Outpatient dermatology follow-up.    1/22: Steroids changed to solumedrol. Continue moisturizers. Patient ok to shower with unscented soap. Started on Vancomycin. Permethrin again Friday. Awaiting precert to SNF.    1/24: Patient with increasing weakness. Discussed potential for steroid-induced myopathy. Patient does not believe this is the case and wants to continue steroids. Patient has been  cleared by ID. Unfortunately, SNF protocol requires a second treatment of permethrin. Patient too weak to discharge home. He will discharge Friday or Saturday after his treatment. Will check stool for parasites but low suspicion given patient is not losing weight.             Roger Lacey, DO

## 2024-01-25 LAB
ANION GAP SERPL CALC-SCNC: 11 MMOL/L (ref 10–20)
BUN SERPL-MCNC: 22 MG/DL (ref 6–23)
CALCIUM SERPL-MCNC: 8.8 MG/DL (ref 8.6–10.3)
CHLORIDE SERPL-SCNC: 104 MMOL/L (ref 98–107)
CO2 SERPL-SCNC: 27 MMOL/L (ref 21–32)
CREAT SERPL-MCNC: 0.77 MG/DL (ref 0.5–1.3)
EGFRCR SERPLBLD CKD-EPI 2021: >90 ML/MIN/1.73M*2
ERYTHROCYTE [DISTWIDTH] IN BLOOD BY AUTOMATED COUNT: 12.7 % (ref 11.5–14.5)
GLUCOSE SERPL-MCNC: 76 MG/DL (ref 74–99)
HCT VFR BLD AUTO: 37.8 % (ref 41–52)
HGB BLD-MCNC: 12 G/DL (ref 13.5–17.5)
MCH RBC QN AUTO: 31.5 PG (ref 26–34)
MCHC RBC AUTO-ENTMCNC: 31.7 G/DL (ref 32–36)
MCV RBC AUTO: 99 FL (ref 80–100)
NRBC BLD-RTO: 0 /100 WBCS (ref 0–0)
PLATELET # BLD AUTO: 410 X10*3/UL (ref 150–450)
POTASSIUM SERPL-SCNC: 4.5 MMOL/L (ref 3.5–5.3)
RBC # BLD AUTO: 3.81 X10*6/UL (ref 4.5–5.9)
SODIUM SERPL-SCNC: 137 MMOL/L (ref 136–145)
WBC # BLD AUTO: 14 X10*3/UL (ref 4.4–11.3)

## 2024-01-25 PROCEDURE — 80048 BASIC METABOLIC PNL TOTAL CA: CPT | Performed by: INTERNAL MEDICINE

## 2024-01-25 PROCEDURE — 2500000004 HC RX 250 GENERAL PHARMACY W/ HCPCS (ALT 636 FOR OP/ED): Performed by: INTERNAL MEDICINE

## 2024-01-25 PROCEDURE — 87329 GIARDIA AG IA: CPT | Performed by: INTERNAL MEDICINE

## 2024-01-25 PROCEDURE — 99232 SBSQ HOSP IP/OBS MODERATE 35: CPT | Performed by: INTERNAL MEDICINE

## 2024-01-25 PROCEDURE — 85027 COMPLETE CBC AUTOMATED: CPT | Performed by: INTERNAL MEDICINE

## 2024-01-25 PROCEDURE — 87328 CRYPTOSPORIDIUM AG IA: CPT | Performed by: INTERNAL MEDICINE

## 2024-01-25 PROCEDURE — 2500000004 HC RX 250 GENERAL PHARMACY W/ HCPCS (ALT 636 FOR OP/ED): Performed by: NURSE PRACTITIONER

## 2024-01-25 PROCEDURE — 36415 COLL VENOUS BLD VENIPUNCTURE: CPT | Performed by: INTERNAL MEDICINE

## 2024-01-25 PROCEDURE — 2500000001 HC RX 250 WO HCPCS SELF ADMINISTERED DRUGS (ALT 637 FOR MEDICARE OP): Performed by: INTERNAL MEDICINE

## 2024-01-25 PROCEDURE — 2500000005 HC RX 250 GENERAL PHARMACY W/O HCPCS: Performed by: INTERNAL MEDICINE

## 2024-01-25 PROCEDURE — 2500000001 HC RX 250 WO HCPCS SELF ADMINISTERED DRUGS (ALT 637 FOR MEDICARE OP): Performed by: NURSE PRACTITIONER

## 2024-01-25 PROCEDURE — G0378 HOSPITAL OBSERVATION PER HR: HCPCS

## 2024-01-25 RX ORDER — LIDOCAINE 560 MG/1
1 PATCH PERCUTANEOUS; TOPICAL; TRANSDERMAL DAILY
Status: DISCONTINUED | OUTPATIENT
Start: 2024-01-25 | End: 2024-01-28 | Stop reason: HOSPADM

## 2024-01-25 RX ORDER — PERMETHRIN 50 MG/G
CREAM TOPICAL ONCE
Status: COMPLETED | OUTPATIENT
Start: 2024-01-25 | End: 2024-01-26

## 2024-01-25 RX ORDER — LIDOCAINE 560 MG/1
1 PATCH PERCUTANEOUS; TOPICAL; TRANSDERMAL DAILY
Start: 2024-01-25

## 2024-01-25 RX ADMIN — OXYCODONE HYDROCHLORIDE AND ACETAMINOPHEN 1 TABLET: 5; 325 TABLET ORAL at 03:24

## 2024-01-25 RX ADMIN — ALPRAZOLAM 0.5 MG: 0.25 TABLET ORAL at 08:04

## 2024-01-25 RX ADMIN — PANTOPRAZOLE SODIUM 20 MG: 20 TABLET, DELAYED RELEASE ORAL at 06:08

## 2024-01-25 RX ADMIN — PREDNISONE 60 MG: 20 TABLET ORAL at 08:02

## 2024-01-25 RX ADMIN — OXYCODONE HYDROCHLORIDE AND ACETAMINOPHEN 1 TABLET: 5; 325 TABLET ORAL at 20:40

## 2024-01-25 RX ADMIN — DOXYCYCLINE HYCLATE 100 MG: 100 TABLET, COATED ORAL at 20:40

## 2024-01-25 RX ADMIN — LIDOCAINE 1 PATCH: 4 PATCH TOPICAL at 12:35

## 2024-01-25 RX ADMIN — DIPHENHYDRAMINE HYDROCHLORIDE 25 MG: 25 CAPSULE ORAL at 17:07

## 2024-01-25 RX ADMIN — LORATADINE 10 MG: 10 TABLET ORAL at 08:03

## 2024-01-25 RX ADMIN — OXYCODONE HYDROCHLORIDE AND ACETAMINOPHEN 1 TABLET: 5; 325 TABLET ORAL at 12:36

## 2024-01-25 RX ADMIN — LOSARTAN POTASSIUM 100 MG: 50 TABLET, FILM COATED ORAL at 08:03

## 2024-01-25 RX ADMIN — DIPHENHYDRAMINE HYDROCHLORIDE 25 MG: 25 CAPSULE ORAL at 08:02

## 2024-01-25 RX ADMIN — AMLODIPINE BESYLATE 10 MG: 10 TABLET ORAL at 08:03

## 2024-01-25 RX ADMIN — Medication 1 TABLET: at 08:03

## 2024-01-25 RX ADMIN — DOXYCYCLINE HYCLATE 100 MG: 100 TABLET, COATED ORAL at 08:03

## 2024-01-25 ASSESSMENT — COGNITIVE AND FUNCTIONAL STATUS - GENERAL
DAILY ACTIVITIY SCORE: 24
STANDING UP FROM CHAIR USING ARMS: A LITTLE
CLIMB 3 TO 5 STEPS WITH RAILING: A LITTLE
MOBILITY SCORE: 23
CLIMB 3 TO 5 STEPS WITH RAILING: A LITTLE
WALKING IN HOSPITAL ROOM: A LITTLE
TURNING FROM BACK TO SIDE WHILE IN FLAT BAD: A LITTLE
MOBILITY SCORE: 18
DAILY ACTIVITIY SCORE: 24
MOVING TO AND FROM BED TO CHAIR: A LITTLE
MOVING FROM LYING ON BACK TO SITTING ON SIDE OF FLAT BED WITH BEDRAILS: A LITTLE

## 2024-01-25 ASSESSMENT — PAIN SCALES - GENERAL
PAINLEVEL_OUTOF10: 0 - NO PAIN
PAINLEVEL_OUTOF10: 8
PAINLEVEL_OUTOF10: 8
PAINLEVEL_OUTOF10: 3
PAINLEVEL_OUTOF10: 9

## 2024-01-25 ASSESSMENT — PAIN DESCRIPTION - DESCRIPTORS
DESCRIPTORS: SHARP
DESCRIPTORS: SHARP

## 2024-01-25 ASSESSMENT — PAIN - FUNCTIONAL ASSESSMENT
PAIN_FUNCTIONAL_ASSESSMENT: 0-10

## 2024-01-25 NOTE — CARE PLAN
Problem: Pain  Goal: Takes deep breaths with improved pain control throughout the shift  Outcome: Progressing  Goal: Walks with improved pain control throughout the shift  Outcome: Progressing  Goal: Performs ADL's with improved pain control throughout shift  Outcome: Progressing  Goal: Participates in PT with improved pain control throughout the shift  Outcome: Progressing  Goal: Free from opioid side effects throughout the shift  Outcome: Progressing  Goal: Free from acute confusion related to pain meds throughout the shift  Outcome: Progressing     Problem: Fall/Injury  Goal: Be free from injury by end of the shift  Outcome: Progressing  Goal: Verbalize understanding of personal risk factors for fall in the hospital  Outcome: Progressing  Goal: Verbalize understanding of risk factor reduction measures to prevent injury from fall in the home  Outcome: Progressing  Goal: Use assistive devices by end of the shift  Outcome: Progressing  Goal: Pace activities to prevent fatigue by end of the shift  Outcome: Progressing   The patient's goals for the shift include      The clinical goals for the shift include pt will not fall for the remainder of the shift       Normal rate, regular rhythm.  Heart sounds S1, S2.  No murmurs, rubs or gallops.

## 2024-01-25 NOTE — PROGRESS NOTES
Raul Jules is a 58 y.o. male on day 0 of admission presenting with General symptom.      Subjective   No events overnight.  Patient seen and examined at bedside.  His skin is significantly improving.  He inquires about his stool studies.  They are still pending.  He is still concerned about a GI parasite.  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       Objective     Last Recorded Vitals  /77   Pulse 87   Temp 36 °C (96.8 °F)   Resp 18   Wt 90 kg (198 lb 6.6 oz)   SpO2 97%   Intake/Output last 3 Shifts:  No intake or output data in the 24 hours ending 01/25/24 0942      Admission Weight  Weight: 83.9 kg (185 lb) (01/18/24 1242)    Daily Weight  01/19/24 : 90 kg (198 lb 6.6 oz)    Image Results  No image results found.      Physical Exam  HENT:      Mouth/Throat:      Mouth: Mucous membranes are moist.      Pharynx: Oropharynx is clear.   Eyes:      Pupils: Pupils are equal, round, and reactive to light.   Cardiovascular:      Rate and Rhythm: Normal rate and regular rhythm.   Pulmonary:      Effort: Pulmonary effort is normal.      Breath sounds: Normal breath sounds.   Abdominal:      General: Bowel sounds are normal.      Palpations: Abdomen is soft.   Musculoskeletal:         General: Normal range of motion.      Cervical back: Normal range of motion.   Skin:     General: Skin is warm.      Comments: Scaly, dry, cracking rash on face/hands/feet, elbows, and chest   Neurological:      General: No focal deficit present.      Mental Status: He is alert and oriented to person, place, and time.   Psychiatric:         Mood and Affect: Mood normal.         Behavior: Behavior normal.      Relevant Results               Assessment/Plan        Principal Problem:    General symptom    Raul is a 58-year-old male patient who is alert and oriented x 3 presenting to emergency department with a rash.  Patient states he has had this rash for the last several years  but worsening over the last few weeks.  Patient states he had a rash like this years ago and followed up with a dermatologist, was prescribed medications which he never took.  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  Patient denies chest pain or dizziness.  Patient has a scaly, dry, cracking rash on his face, hands, feet, chest, and bilateral elbows.  Patient medicated with Benadryl and prednisone p.o.  Patient admitted for further medical management.     Rash/failure to thrive  Suspected scabies  Head lice  Admit to observation per Dr. Roger Lacey  Prednisone p.o. daily  Benadryl x 2 IV in ED  Continue IV fluids  Urinalysis pending  Labs unremarkable  Aquaphor cream as needed  PT/OT  Social work consult for discharge planning  Repeat labs in am  Permethrin cream for skin  Permethrin liquid for scalp  ID consulted, appreciate recs  HIV screening per ID recs     GERD/hypertension/hyperlipidemia/COPD/eczema/HSV of the eye/TBI/anxiety  Continue home medications  Cardiac diet  IV Metoprolol X 1 for htn     DVT Ppx  SCDs  Activity as tolerated     1/20: Patient is status post treatment with permethrin for suspected scabies and head lice.  He has pruritus and formication have improved.  Blood pressures remain elevated, increase amlodipine no started yesterday.  Patient medically ready for discharge to SNF.    1/21: HIV negative.  Awaiting pre[facility] nursing facility.  Outpatient dermatology follow-up.    1/22: Steroids changed to solumedrol. Continue moisturizers. Patient ok to shower with unscented soap. Started on Vancomycin. Permethrin again Friday. Awaiting precert to SNF.    1/24: Patient with increasing weakness. Discussed potential for steroid-induced myopathy. Patient does not believe this is the case and wants to continue steroids. Patient has been cleared by ID. Unfortunately, SNF protocol requires a second treatment of permethrin. Patient too weak to discharge home. He will discharge  Friday or Saturday after his treatment. Will check stool for parasites but low suspicion given patient is not losing weight.     1/25: Leukocytosis secondary to steroids.  Continue current management.  Stool for ova and parasites pending.  Permethrin treatment tomorrow.  Lidoderm patch for shoulder pain.  Anticipate discharge to SNF in 24 to 48 hours.              Roger Lacey, DO

## 2024-01-25 NOTE — PROGRESS NOTES
Physical Therapy                 Therapy Communication Note    Patient Name: Raul Jules  MRN: [id number]  Today's Date: 1/25/2024     Discipline: Physical Therapy    Missed Visit Reason: Missed Visit Reason: Patient politely refused stating he's not feeling well and is very tired from not being able to sleep at night.    Missed Time: Attempt 11:30

## 2024-01-25 NOTE — CARE PLAN
The patient's goals for the shift include      The clinical goals for the shift include pt will not fall for the remainder of the shift

## 2024-01-25 NOTE — PROGRESS NOTES
SW consulted to speak with pt. SW reviewed chart and pt will need next dose of Permethrin before dc to [facility]. SW attempted to visit with pt but pt not in room and showering per floor staff. NGOC updated Assigned floor RN and will visit with pt tomorrow.     JUSTIN ELIZABETH

## 2024-01-26 LAB
ANION GAP SERPL CALC-SCNC: 13 MMOL/L (ref 10–20)
BUN SERPL-MCNC: 24 MG/DL (ref 6–23)
CALCIUM SERPL-MCNC: 9 MG/DL (ref 8.6–10.3)
CHLORIDE SERPL-SCNC: 103 MMOL/L (ref 98–107)
CO2 SERPL-SCNC: 25 MMOL/L (ref 21–32)
CREAT SERPL-MCNC: 0.76 MG/DL (ref 0.5–1.3)
EGFRCR SERPLBLD CKD-EPI 2021: >90 ML/MIN/1.73M*2
ERYTHROCYTE [DISTWIDTH] IN BLOOD BY AUTOMATED COUNT: 12.7 % (ref 11.5–14.5)
GLUCOSE SERPL-MCNC: 79 MG/DL (ref 74–99)
HCT VFR BLD AUTO: 38.7 % (ref 41–52)
HGB BLD-MCNC: 12.8 G/DL (ref 13.5–17.5)
MCH RBC QN AUTO: 32.7 PG (ref 26–34)
MCHC RBC AUTO-ENTMCNC: 33.1 G/DL (ref 32–36)
MCV RBC AUTO: 99 FL (ref 80–100)
NRBC BLD-RTO: 0 /100 WBCS (ref 0–0)
PLATELET # BLD AUTO: 425 X10*3/UL (ref 150–450)
POTASSIUM SERPL-SCNC: 4.1 MMOL/L (ref 3.5–5.3)
RBC # BLD AUTO: 3.91 X10*6/UL (ref 4.5–5.9)
SODIUM SERPL-SCNC: 137 MMOL/L (ref 136–145)
WBC # BLD AUTO: 13.6 X10*3/UL (ref 4.4–11.3)

## 2024-01-26 PROCEDURE — G0378 HOSPITAL OBSERVATION PER HR: HCPCS

## 2024-01-26 PROCEDURE — 85027 COMPLETE CBC AUTOMATED: CPT | Performed by: INTERNAL MEDICINE

## 2024-01-26 PROCEDURE — 82374 ASSAY BLOOD CARBON DIOXIDE: CPT | Performed by: INTERNAL MEDICINE

## 2024-01-26 PROCEDURE — 2500000004 HC RX 250 GENERAL PHARMACY W/ HCPCS (ALT 636 FOR OP/ED): Performed by: NURSE PRACTITIONER

## 2024-01-26 PROCEDURE — 94640 AIRWAY INHALATION TREATMENT: CPT

## 2024-01-26 PROCEDURE — 2500000001 HC RX 250 WO HCPCS SELF ADMINISTERED DRUGS (ALT 637 FOR MEDICARE OP): Performed by: INTERNAL MEDICINE

## 2024-01-26 PROCEDURE — 2500000005 HC RX 250 GENERAL PHARMACY W/O HCPCS: Performed by: INTERNAL MEDICINE

## 2024-01-26 PROCEDURE — 2500000004 HC RX 250 GENERAL PHARMACY W/ HCPCS (ALT 636 FOR OP/ED): Performed by: INTERNAL MEDICINE

## 2024-01-26 PROCEDURE — 2500000001 HC RX 250 WO HCPCS SELF ADMINISTERED DRUGS (ALT 637 FOR MEDICARE OP): Performed by: NURSE PRACTITIONER

## 2024-01-26 PROCEDURE — 36415 COLL VENOUS BLD VENIPUNCTURE: CPT | Performed by: INTERNAL MEDICINE

## 2024-01-26 PROCEDURE — 99232 SBSQ HOSP IP/OBS MODERATE 35: CPT | Performed by: INTERNAL MEDICINE

## 2024-01-26 RX ORDER — CALCIUM CARBONATE 200(500)MG
500 TABLET,CHEWABLE ORAL ONCE
Status: COMPLETED | OUTPATIENT
Start: 2024-01-26 | End: 2024-01-27

## 2024-01-26 RX ADMIN — PANTOPRAZOLE SODIUM 20 MG: 20 TABLET, DELAYED RELEASE ORAL at 04:50

## 2024-01-26 RX ADMIN — LIDOCAINE 1 PATCH: 4 PATCH TOPICAL at 08:38

## 2024-01-26 RX ADMIN — Medication: at 21:00

## 2024-01-26 RX ADMIN — PERMETHRIN: 50 CREAM TOPICAL at 04:50

## 2024-01-26 RX ADMIN — Medication 1 TABLET: at 08:39

## 2024-01-26 RX ADMIN — LOSARTAN POTASSIUM 100 MG: 50 TABLET, FILM COATED ORAL at 07:18

## 2024-01-26 RX ADMIN — AMLODIPINE BESYLATE 10 MG: 10 TABLET ORAL at 07:18

## 2024-01-26 RX ADMIN — ALPRAZOLAM 0.5 MG: 0.25 TABLET ORAL at 08:39

## 2024-01-26 RX ADMIN — LORATADINE 10 MG: 10 TABLET ORAL at 08:39

## 2024-01-26 RX ADMIN — DOXYCYCLINE HYCLATE 100 MG: 100 TABLET, COATED ORAL at 21:06

## 2024-01-26 RX ADMIN — DOXYCYCLINE HYCLATE 100 MG: 100 TABLET, COATED ORAL at 08:39

## 2024-01-26 RX ADMIN — OXYCODONE HYDROCHLORIDE AND ACETAMINOPHEN 1 TABLET: 5; 325 TABLET ORAL at 08:23

## 2024-01-26 RX ADMIN — PREDNISONE 60 MG: 20 TABLET ORAL at 08:39

## 2024-01-26 RX ADMIN — DIPHENHYDRAMINE HYDROCHLORIDE 25 MG: 25 CAPSULE ORAL at 01:26

## 2024-01-26 RX ADMIN — OXYCODONE HYDROCHLORIDE AND ACETAMINOPHEN 1 TABLET: 5; 325 TABLET ORAL at 17:18

## 2024-01-26 RX ADMIN — DIPHENHYDRAMINE HYDROCHLORIDE 25 MG: 25 CAPSULE ORAL at 12:01

## 2024-01-26 ASSESSMENT — PAIN - FUNCTIONAL ASSESSMENT
PAIN_FUNCTIONAL_ASSESSMENT: 0-10

## 2024-01-26 ASSESSMENT — PAIN SCALES - GENERAL
PAINLEVEL_OUTOF10: 3
PAINLEVEL_OUTOF10: 8
PAINLEVEL_OUTOF10: 8
PAINLEVEL_OUTOF10: 0 - NO PAIN

## 2024-01-26 ASSESSMENT — PAIN DESCRIPTION - LOCATION: LOCATION: ARM

## 2024-01-26 NOTE — PROGRESS NOTES
TCC Note: Spoke with Liaison from [facility] to confirm when they would be able to accept pt, after he receives last of Prometherin treatment. Liaison stated that pt would be able to admit 24 hours after the last of the medication administration, which pt will receive today. Will plan for Saturday admission pending hospital course. Thais Camargo, MSN, RN, TCC.    Discharge Planning Flowsheet    Living Arrangements Alone   RoundTrip coordination needed? Yes   Support Systems Friends/neighbors   Has discharge transport been arranged? No   Assistance Needed none   What day is the transport expected? 01/23/24   Type of Residence Private residence   How hard is it for you to pay for the very basics like food, housing, medical care, and heating? Pt Declined   Do you have animals or pets at home? Yes   In the last 12 months, was there a time when you were not able to pay the mortgage or rent on time? Pt Declined   Who is requesting discharge planning? Patient   In the last 12 months, how many places have you lived? 0   Home or Post Acute Services In home services   In the last 12 months, was there a time when you did not have a steady place to sleep or slept in a shelter (including now)? Pt Declined   Type of Home Care Services Meals on Wheels   In the past 12 months, has lack of transportation kept you from medical appointments or from getting medications? Pt Declined   Patient expects to be discharged to: [facility]   In the past 12 months, has lack of transportation kept you from meetings, work, or from getting things needed for daily living? Pt Declined   Does the patient need discharge transport arranged? Yes   Patient / Family choosing to utilize agency / facility established prior to hospitalization No        99

## 2024-01-26 NOTE — PROGRESS NOTES
Infectious disease progress note  Subjective   Presumptive scabies  Autoimmune dermatitis    Antibiotics  Patient to receive second dose of permethrin today  Objective   Range of Vitals (last 24 hours)  Heart Rate:  [56-97]   Temp:  [35.9 °C (96.7 °F)-37.2 °C (99 °F)]   Resp:  [17-18]   BP: (115-165)/()   SpO2:  [97 %-98 %]   Daily Weight  01/19/24 : 90 kg (198 lb 6.6 oz)    Body mass index is 29.39 kg/m².      Physical Exam  Skin dry cracked consistent with dermatitis      Relevant Results  Labs  Lab Results   Component Value Date    WBC 13.6 (H) 01/26/2024    HGB 12.8 (L) 01/26/2024    HCT 38.7 (L) 01/26/2024    MCV 99 01/26/2024     01/26/2024     Lab Results   Component Value Date    GLUCOSE 79 01/26/2024    CALCIUM 9.0 01/26/2024     01/26/2024    K 4.1 01/26/2024    CO2 25 01/26/2024     01/26/2024    BUN 24 (H) 01/26/2024    CREATININE 0.76 01/26/2024   ESR: --  Lab Results   Component Value Date    SEDRATE 10 01/18/2024     Lab Results   Component Value Date    CRP 0.69 01/18/2024     Lab Results   Component Value Date    ALT 21 01/18/2024    AST 20 01/18/2024    ALKPHOS 58 01/18/2024    BILITOT 0.5 01/18/2024       Microbiology  1- HIV nonreactive    Imaging  None available  Assessment/Plan   1.  Apply permethrin today leave on 24 hours then showered off  Patient okay to discharge to extended-care facility    Other issues  Anxiety chronic for patient  COPD chronic for patient  Eczema chronic for patient  GERD chronic for patient  HSV of the eye  Hyperlipidemia chronic for patient chronic for patient    I reviewed and interpreted all lab test imaging studies and documentations from other healthcare providers  I am monitoring for antibiotic side effects and toxicity     Nora Kent MD

## 2024-01-26 NOTE — CARE PLAN
Problem: Pain  Goal: Walks with improved pain control throughout the shift  Outcome: Progressing     Problem: Fall/Injury  Goal: Be free from injury by end of the shift  Outcome: Met     Problem: Fall/Injury  Goal: Use assistive devices by end of the shift  Outcome: Met   The patient's goals for the shift include  pain control     The clinical goals for the shift include no falls this shift

## 2024-01-27 LAB
ANION GAP SERPL CALC-SCNC: 15 MMOL/L (ref 10–20)
BUN SERPL-MCNC: 30 MG/DL (ref 6–23)
CALCIUM SERPL-MCNC: 9.9 MG/DL (ref 8.6–10.3)
CHLORIDE SERPL-SCNC: 102 MMOL/L (ref 98–107)
CO2 SERPL-SCNC: 26 MMOL/L (ref 21–32)
CREAT SERPL-MCNC: 1.05 MG/DL (ref 0.5–1.3)
EGFRCR SERPLBLD CKD-EPI 2021: 81 ML/MIN/1.73M*2
ERYTHROCYTE [DISTWIDTH] IN BLOOD BY AUTOMATED COUNT: 12.8 % (ref 11.5–14.5)
GLUCOSE SERPL-MCNC: 73 MG/DL (ref 74–99)
HCT VFR BLD AUTO: 44.9 % (ref 41–52)
HGB BLD-MCNC: 15 G/DL (ref 13.5–17.5)
MCH RBC QN AUTO: 32.3 PG (ref 26–34)
MCHC RBC AUTO-ENTMCNC: 33.4 G/DL (ref 32–36)
MCV RBC AUTO: 97 FL (ref 80–100)
NRBC BLD-RTO: 0 /100 WBCS (ref 0–0)
PLATELET # BLD AUTO: 536 X10*3/UL (ref 150–450)
POTASSIUM SERPL-SCNC: 4.7 MMOL/L (ref 3.5–5.3)
RBC # BLD AUTO: 4.64 X10*6/UL (ref 4.5–5.9)
SODIUM SERPL-SCNC: 138 MMOL/L (ref 136–145)
WBC # BLD AUTO: 17.6 X10*3/UL (ref 4.4–11.3)

## 2024-01-27 PROCEDURE — 2500000004 HC RX 250 GENERAL PHARMACY W/ HCPCS (ALT 636 FOR OP/ED): Performed by: NURSE PRACTITIONER

## 2024-01-27 PROCEDURE — 36415 COLL VENOUS BLD VENIPUNCTURE: CPT | Performed by: INTERNAL MEDICINE

## 2024-01-27 PROCEDURE — 99232 SBSQ HOSP IP/OBS MODERATE 35: CPT | Performed by: INTERNAL MEDICINE

## 2024-01-27 PROCEDURE — 2500000001 HC RX 250 WO HCPCS SELF ADMINISTERED DRUGS (ALT 637 FOR MEDICARE OP): Performed by: INTERNAL MEDICINE

## 2024-01-27 PROCEDURE — 2500000001 HC RX 250 WO HCPCS SELF ADMINISTERED DRUGS (ALT 637 FOR MEDICARE OP): Performed by: NURSE PRACTITIONER

## 2024-01-27 PROCEDURE — 80048 BASIC METABOLIC PNL TOTAL CA: CPT | Performed by: INTERNAL MEDICINE

## 2024-01-27 PROCEDURE — G0378 HOSPITAL OBSERVATION PER HR: HCPCS

## 2024-01-27 PROCEDURE — 2500000004 HC RX 250 GENERAL PHARMACY W/ HCPCS (ALT 636 FOR OP/ED): Performed by: INTERNAL MEDICINE

## 2024-01-27 PROCEDURE — 2500000001 HC RX 250 WO HCPCS SELF ADMINISTERED DRUGS (ALT 637 FOR MEDICARE OP)

## 2024-01-27 PROCEDURE — 85027 COMPLETE CBC AUTOMATED: CPT | Performed by: INTERNAL MEDICINE

## 2024-01-27 RX ORDER — ALPRAZOLAM 0.25 MG/1
0.5 TABLET ORAL ONCE
Status: COMPLETED | OUTPATIENT
Start: 2024-01-27 | End: 2024-01-27

## 2024-01-27 RX ADMIN — ALPRAZOLAM 0.5 MG: 0.25 TABLET ORAL at 09:48

## 2024-01-27 RX ADMIN — ALPRAZOLAM 0.5 MG: 0.25 TABLET ORAL at 20:41

## 2024-01-27 RX ADMIN — OXYCODONE HYDROCHLORIDE AND ACETAMINOPHEN 1 TABLET: 5; 325 TABLET ORAL at 09:48

## 2024-01-27 RX ADMIN — DIPHENHYDRAMINE HYDROCHLORIDE 25 MG: 25 CAPSULE ORAL at 01:33

## 2024-01-27 RX ADMIN — OXYCODONE HYDROCHLORIDE AND ACETAMINOPHEN 1 TABLET: 5; 325 TABLET ORAL at 01:33

## 2024-01-27 RX ADMIN — DOXYCYCLINE HYCLATE 100 MG: 100 TABLET, COATED ORAL at 09:49

## 2024-01-27 RX ADMIN — DOXYCYCLINE HYCLATE 100 MG: 100 TABLET, COATED ORAL at 20:41

## 2024-01-27 RX ADMIN — Medication 1 TABLET: at 09:48

## 2024-01-27 RX ADMIN — PANTOPRAZOLE SODIUM 20 MG: 20 TABLET, DELAYED RELEASE ORAL at 06:51

## 2024-01-27 RX ADMIN — PREDNISONE 60 MG: 20 TABLET ORAL at 09:48

## 2024-01-27 RX ADMIN — LORATADINE 10 MG: 10 TABLET ORAL at 09:49

## 2024-01-27 RX ADMIN — AMLODIPINE BESYLATE 10 MG: 10 TABLET ORAL at 09:49

## 2024-01-27 RX ADMIN — LOSARTAN POTASSIUM 100 MG: 50 TABLET, FILM COATED ORAL at 09:49

## 2024-01-27 RX ADMIN — CALCIUM CARBONATE (ANTACID) CHEW TAB 500 MG 500 MG: 500 CHEW TAB at 01:33

## 2024-01-27 RX ADMIN — OXYCODONE HYDROCHLORIDE AND ACETAMINOPHEN 1 TABLET: 5; 325 TABLET ORAL at 22:01

## 2024-01-27 ASSESSMENT — PAIN - FUNCTIONAL ASSESSMENT
PAIN_FUNCTIONAL_ASSESSMENT: 0-10

## 2024-01-27 ASSESSMENT — PAIN SCALES - GENERAL
PAINLEVEL_OUTOF10: 4
PAINLEVEL_OUTOF10: 6
PAINLEVEL_OUTOF10: 8
PAINLEVEL_OUTOF10: 4
PAINLEVEL_OUTOF10: 8
PAINLEVEL_OUTOF10: 8
PAINLEVEL_OUTOF10: 7

## 2024-01-27 ASSESSMENT — COGNITIVE AND FUNCTIONAL STATUS - GENERAL
DRESSING REGULAR LOWER BODY CLOTHING: A LITTLE
MOVING FROM LYING ON BACK TO SITTING ON SIDE OF FLAT BED WITH BEDRAILS: A LITTLE
TOILETING: A LITTLE
HELP NEEDED FOR BATHING: A LITTLE
CLIMB 3 TO 5 STEPS WITH RAILING: A LITTLE
DRESSING REGULAR UPPER BODY CLOTHING: A LITTLE
PERSONAL GROOMING: A LITTLE
STANDING UP FROM CHAIR USING ARMS: A LITTLE
DAILY ACTIVITIY SCORE: 19
MOBILITY SCORE: 18
MOVING TO AND FROM BED TO CHAIR: A LITTLE
TURNING FROM BACK TO SIDE WHILE IN FLAT BAD: A LITTLE
WALKING IN HOSPITAL ROOM: A LITTLE

## 2024-01-27 ASSESSMENT — PAIN DESCRIPTION - LOCATION
LOCATION: GENERALIZED
LOCATION: SHOULDER

## 2024-01-27 NOTE — PROGRESS NOTES
TCC Note: Pt to admit today to [facility]. Pt can go 24 hours after his last administration of Promethrin. Will confirm with RN about time last administration occurred and contact [facility] to set up transport time accordingly. Will follow. Thais Camargo, MSN, RN, TCC.    ADDENDUM: This patient was supposed to get a second Promethrin Treatment yesterday and then could admit to [facility] today. He did Not get that treatment until 5:00 AM this morning and they did not get his back, so RN is applying that to pt's back now.  So, [facility] cannot take until tomorrow- 24 hours after the treatment on his back. SW updated that Admission to [facility] now on hold until tomorrow ( Sunday, January, 28th after 11:30 AM). Will continue to follow. Thais Camargo, MSN, RN, TCC.

## 2024-01-27 NOTE — PROGRESS NOTES
Raul Jules is a 58 y.o. male on day 0 of admission presenting with General symptom.      Subjective   No events overnight.  Patient seen and examined at bedside.  No new complaints. Second permethrin treatment today.       Objective     Last Recorded Vitals  /79 (BP Location: Left arm, Patient Position: Sitting)   Pulse 81   Temp 36.7 °C (98.1 °F) (Temporal)   Resp 16   Wt 90 kg (198 lb 6.6 oz)   SpO2 96%   Intake/Output last 3 Shifts:    Intake/Output Summary (Last 24 hours) at 1/26/2024 1906  Last data filed at 1/26/2024 1833  Gross per 24 hour   Intake 780 ml   Output --   Net 780 ml         Admission Weight  Weight: 83.9 kg (185 lb) (01/18/24 1242)    Daily Weight  01/19/24 : 90 kg (198 lb 6.6 oz)    Image Results  No image results found.      Physical Exam  HENT:      Mouth/Throat:      Mouth: Mucous membranes are moist.      Pharynx: Oropharynx is clear.   Eyes:      Pupils: Pupils are equal, round, and reactive to light.   Cardiovascular:      Rate and Rhythm: Normal rate and regular rhythm.   Pulmonary:      Effort: Pulmonary effort is normal.      Breath sounds: Normal breath sounds.   Abdominal:      General: Bowel sounds are normal.      Palpations: Abdomen is soft.   Musculoskeletal:         General: Normal range of motion.      Cervical back: Normal range of motion.   Skin:     General: Skin is warm.      Comments: Scaly, dry, cracking rash on face/hands/feet, elbows, and chest   Neurological:      General: No focal deficit present.      Mental Status: He is alert and oriented to person, place, and time.   Psychiatric:         Mood and Affect: Mood normal.         Behavior: Behavior normal.      Relevant Results               Assessment/Plan        Principal Problem:    General symptom    Raul is a 58-year-old male patient who is alert and oriented x 3 presenting to emergency department with a rash.  Patient states he has had this rash for the last several years but worsening over the last  few weeks.  Patient states he had a rash like this years ago and followed up with a dermatologist, was prescribed medications which he never took.  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  Patient denies chest pain or dizziness.  Patient has a scaly, dry, cracking rash on his face, hands, feet, chest, and bilateral elbows.  Patient medicated with Benadryl and prednisone p.o.  Patient admitted for further medical management.     Rash/failure to thrive  Suspected scabies  Head lice  Admit to observation per Dr. Roger Lacey  Prednisone p.o. daily  Benadryl x 2 IV in ED  Continue IV fluids  Urinalysis pending  Labs unremarkable  Aquaphor cream as needed  PT/OT  Social work consult for discharge planning  Repeat labs in am  Permethrin cream for skin  Permethrin liquid for scalp  ID consulted, appreciate recs  HIV screening per ID recs     GERD/hypertension/hyperlipidemia/COPD/eczema/HSV of the eye/TBI/anxiety  Continue home medications  Cardiac diet  IV Metoprolol X 1 for htn     DVT Ppx  SCDs  Activity as tolerated     1/20: Patient is status post treatment with permethrin for suspected scabies and head lice.  He has pruritus and formication have improved.  Blood pressures remain elevated, increase amlodipine no started yesterday.  Patient medically ready for discharge to SNF.    1/21: HIV negative.  Awaiting pre-[facility] nursing facility.  Outpatient dermatology follow-up.    1/22: Steroids changed to solumedrol. Continue moisturizers. Patient ok to shower with unscented soap. Started on Vancomycin. Permethrin again Friday. Awaiting precert to SNF.    1/24: Patient with increasing weakness. Discussed potential for steroid-induced myopathy. Patient does not believe this is the case and wants to continue steroids. Patient has been cleared by ID. Unfortunately, SNF protocol requires a second treatment of permethrin. Patient too weak to discharge home. He will discharge Friday or Saturday after his  treatment. Will check stool for parasites but low suspicion given patient is not losing weight.     1/25: Leukocytosis secondary to steroids.  Continue current management.  Stool for ova and parasites pending.  Permethrin treatment tomorrow.  Lidoderm patch for shoulder pain.  Anticipate discharge to SNF in 24 to 48 hours.    1/26: Stool studies pending. Patient seen and examined at bedside. Second permethrin treatment today. Discharge to SNF tomorrow.            Roger Lacey, DO

## 2024-01-27 NOTE — PROGRESS NOTES
Raul Jules is a 58 y.o. male on day 0 of admission presenting with General symptom.      Subjective   Permethrin not applied until early this morning.  Patient seen and examined at bedside.  Inquires about stool studies.  Instructed that there is still pending.       Objective     Last Recorded Vitals  /76 (BP Location: Right arm, Patient Position: Sitting)   Pulse 98   Temp 36.5 °C (97.7 °F) (Temporal)   Resp 16   Wt 90 kg (198 lb 6.6 oz)   SpO2 96%   Intake/Output last 3 Shifts:    Intake/Output Summary (Last 24 hours) at 1/27/2024 1601  Last data filed at 1/26/2024 1833  Gross per 24 hour   Intake 240 ml   Output --   Net 240 ml         Admission Weight  Weight: 83.9 kg (185 lb) (01/18/24 1242)    Daily Weight  01/19/24 : 90 kg (198 lb 6.6 oz)    Image Results  No image results found.      Physical Exam  HENT:      Mouth/Throat:      Mouth: Mucous membranes are moist.      Pharynx: Oropharynx is clear.   Eyes:      Pupils: Pupils are equal, round, and reactive to light.   Cardiovascular:      Rate and Rhythm: Normal rate and regular rhythm.   Pulmonary:      Effort: Pulmonary effort is normal.      Breath sounds: Normal breath sounds.   Abdominal:      General: Bowel sounds are normal.      Palpations: Abdomen is soft.   Musculoskeletal:         General: Normal range of motion.      Cervical back: Normal range of motion.   Skin:     General: Skin is warm.      Comments: Scaly, dry, cracking rash on face/hands/feet, elbows, and chest   Neurological:      General: No focal deficit present.      Mental Status: He is alert and oriented to person, place, and time.   Psychiatric:         Mood and Affect: Mood normal.         Behavior: Behavior normal.      Relevant Results               Assessment/Plan        Principal Problem:    General symptom    Raul is a 58-year-old male patient who is alert and oriented x 3 presenting to emergency department with a rash.  Patient states he has had this rash for the  last several years but worsening over the last few weeks.  Patient states he had a rash like this years ago and followed up with a dermatologist, was prescribed medications which he never took.  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  Patient denies chest pain or dizziness.  Patient has a scaly, dry, cracking rash on his face, hands, feet, chest, and bilateral elbows.  Patient medicated with Benadryl and prednisone p.o.  Patient admitted for further medical management.     Rash/failure to thrive  Suspected scabies  Head lice  Admit to observation per Dr. Roger Lacey  Prednisone p.o. daily  Benadryl x 2 IV in ED  Continue IV fluids  Urinalysis pending  Labs unremarkable  Aquaphor cream as needed  PT/OT  Social work consult for discharge planning  Repeat labs in am  Permethrin cream for skin  Permethrin liquid for scalp  ID consulted, appreciate recs  HIV screening per ID recs     GERD/hypertension/hyperlipidemia/COPD/eczema/HSV of the eye/TBI/anxiety  Continue home medications  Cardiac diet  IV Metoprolol X 1 for htn     DVT Ppx  SCDs  Activity as tolerated     1/20: Patient is status post treatment with permethrin for suspected scabies and head lice.  He has pruritus and formication have improved.  Blood pressures remain elevated, increase amlodipine no started yesterday.  Patient medically ready for discharge to SNF.    1/21: HIV negative.  Awaiting pre-[facility] nursing facility.  Outpatient dermatology follow-up.    1/22: Steroids changed to solumedrol. Continue moisturizers. Patient ok to shower with unscented soap. Started on Vancomycin. Permethrin again Friday. Awaiting precert to SNF.    1/24: Patient with increasing weakness. Discussed potential for steroid-induced myopathy. Patient does not believe this is the case and wants to continue steroids. Patient has been cleared by ID. Unfortunately, SNF protocol requires a second treatment of permethrin. Patient too weak to discharge home. He  will discharge Friday or Saturday after his treatment. Will check stool for parasites but low suspicion given patient is not losing weight.     1/25: Leukocytosis secondary to steroids.  Continue current management.  Stool for ova and parasites pending.  Permethrin treatment tomorrow.  Lidoderm patch for shoulder pain.  Anticipate discharge to SNF in 24 to 48 hours.    1/26: Stool studies pending. Patient seen and examined at bedside. Second permethrin treatment today. Discharge to SNF tomorrow.    1/27: Second permethrin treatment applied today.  Patient will discharge to SNF tomorrow.  Stool studies still pending.            Roger Lacey, DO

## 2024-01-27 NOTE — CARE PLAN
The patient's goals for the shift include      The clinical goals for the shift include pt will remain hds during shfit    Over the shift, the patient did not make progress toward the following goals. Barriers to progression include acute illness. Recommendations to address these barriers include Communication.

## 2024-01-28 VITALS
SYSTOLIC BLOOD PRESSURE: 152 MMHG | RESPIRATION RATE: 16 BRPM | WEIGHT: 198.41 LBS | BODY MASS INDEX: 29.39 KG/M2 | HEIGHT: 69 IN | OXYGEN SATURATION: 97 % | DIASTOLIC BLOOD PRESSURE: 70 MMHG | TEMPERATURE: 98.1 F | HEART RATE: 84 BPM

## 2024-01-28 LAB
CRYPTOSP AG STL QL IA: NEGATIVE
CRYPTOSP AG STL QL IA: NEGATIVE
G LAMBLIA AG STL QL IA: NEGATIVE
G LAMBLIA AG STL QL IA: POSITIVE

## 2024-01-28 PROCEDURE — 2500000004 HC RX 250 GENERAL PHARMACY W/ HCPCS (ALT 636 FOR OP/ED): Performed by: NURSE PRACTITIONER

## 2024-01-28 PROCEDURE — 99239 HOSP IP/OBS DSCHRG MGMT >30: CPT | Performed by: INTERNAL MEDICINE

## 2024-01-28 PROCEDURE — 2500000001 HC RX 250 WO HCPCS SELF ADMINISTERED DRUGS (ALT 637 FOR MEDICARE OP): Performed by: NURSE PRACTITIONER

## 2024-01-28 PROCEDURE — 2500000001 HC RX 250 WO HCPCS SELF ADMINISTERED DRUGS (ALT 637 FOR MEDICARE OP): Performed by: INTERNAL MEDICINE

## 2024-01-28 PROCEDURE — 2500000004 HC RX 250 GENERAL PHARMACY W/ HCPCS (ALT 636 FOR OP/ED): Performed by: INTERNAL MEDICINE

## 2024-01-28 PROCEDURE — 2500000005 HC RX 250 GENERAL PHARMACY W/O HCPCS: Performed by: INTERNAL MEDICINE

## 2024-01-28 PROCEDURE — G0378 HOSPITAL OBSERVATION PER HR: HCPCS

## 2024-01-28 RX ORDER — CALCIUM CARBONATE 200(500)MG
500 TABLET,CHEWABLE ORAL 4 TIMES DAILY PRN
Status: DISCONTINUED | OUTPATIENT
Start: 2024-01-28 | End: 2024-01-28 | Stop reason: HOSPADM

## 2024-01-28 RX ADMIN — LORATADINE 10 MG: 10 TABLET ORAL at 08:12

## 2024-01-28 RX ADMIN — Medication 1 TABLET: at 08:12

## 2024-01-28 RX ADMIN — LIDOCAINE 1 PATCH: 4 PATCH TOPICAL at 08:13

## 2024-01-28 RX ADMIN — AMLODIPINE BESYLATE 10 MG: 10 TABLET ORAL at 08:12

## 2024-01-28 RX ADMIN — LOSARTAN POTASSIUM 100 MG: 50 TABLET, FILM COATED ORAL at 08:13

## 2024-01-28 RX ADMIN — CALCIUM CARBONATE (ANTACID) CHEW TAB 500 MG 500 MG: 500 CHEW TAB at 06:37

## 2024-01-28 RX ADMIN — PANTOPRAZOLE SODIUM 20 MG: 20 TABLET, DELAYED RELEASE ORAL at 06:25

## 2024-01-28 RX ADMIN — OXYCODONE HYDROCHLORIDE AND ACETAMINOPHEN 1 TABLET: 5; 325 TABLET ORAL at 08:11

## 2024-01-28 RX ADMIN — PREDNISONE 60 MG: 20 TABLET ORAL at 08:12

## 2024-01-28 RX ADMIN — ALPRAZOLAM 0.5 MG: 0.25 TABLET ORAL at 08:11

## 2024-01-28 RX ADMIN — DOXYCYCLINE HYCLATE 100 MG: 100 TABLET, COATED ORAL at 08:12

## 2024-01-28 ASSESSMENT — PAIN SCALES - GENERAL
PAINLEVEL_OUTOF10: 4
PAINLEVEL_OUTOF10: 5 - MODERATE PAIN
PAINLEVEL_OUTOF10: 4
PAINLEVEL_OUTOF10: 9

## 2024-01-28 ASSESSMENT — PAIN - FUNCTIONAL ASSESSMENT
PAIN_FUNCTIONAL_ASSESSMENT: 0-10

## 2024-01-28 NOTE — CARE PLAN
The patient's goals for the shift include      The clinical goals for the shift include Patient to remain safe during the shift    Over the shift, the patient did not make progress toward the following goals. Barriers to progression include acute illness. Recommendations to address these barriers include communication.

## 2024-01-28 NOTE — NURSING NOTE
1115: Attempted to call report - spoke with Joanna from admissions who did not have everything she needed for patient to leave, she needed information from social work still. I spoke with her & let her know that it is documented that the  patient has completed both of his treatments that he was able to come after 1130 today. - spoke with pradeep rodriguez who will send over information requested. Nurse report was given over phone.       Patients home meds were returned to him from pharmacy       Also let Joanna in admission know he has stool samples in process that the doctors will get results of after DC.           1155:  for DC to the [facility]

## 2024-01-28 NOTE — PROGRESS NOTES
Spoke with nsg, pt. Has had his last dose of Promethrin yesterday & note that he will be ready to discharged after 1130. States that he needs to be transported by ambulance & does not need O2.   Requested [facility] arrange ambulance.  Pt. Is aware.     1100, rec'd confirmation of transport @ 1130. Informed nsg who will make pt aware. JUSTIN Kee

## 2024-01-28 NOTE — CARE PLAN
The patient's goals for the shift include  patient to have decrease in itching    The clinical goals for the shift include Patient to remain safe during the shift    Over the shift, the patient did not make progress toward the following goals. Barriers to progression include still with itching . Recommendations to address these barriers include continue to monitor.

## 2024-01-28 NOTE — DISCHARGE SUMMARY
Discharge Diagnosis  General symptom    Issues Requiring Follow-Up  Stool studies    Discharge Meds     Your medication list        START taking these medications        Instructions Last Dose Given Next Dose Due   amLODIPine 10 mg tablet  Commonly known as: Norvasc      Take 1 tablet (10 mg) by mouth once daily. Do not start before January 21, 2024.       diphenhydrAMINE 25 mg capsule  Commonly known as: BENADryl      Take 1 capsule (25 mg) by mouth every 6 hours if needed for itching.       doxycycline 100 mg capsule  Commonly known as: Vibramycin      Take 1 capsule (100 mg) by mouth 2 times a day for 14 days. Take with at least 8 ounces (large glass) of water, do not lie down for 30 minutes after       lidocaine 4 % patch      Place 1 patch over 12 hours on the skin once daily. Remove & discard patch within 12 hours or as directed by MD.       lubricating eye drops ophthalmic solution      Administer 2 drops into both eyes 3 times a day as needed for dry eyes.       mineral oil-hydrophilic petrolatum ointment  Commonly known as: Aquaphor      Apply topically 2 times a day.       predniSONE 10 mg tablet  Commonly known as: Deltasone  Start taking on: January 23, 2024      Take 4 tablets (40 mg) by mouth once daily for 3 days, THEN 3 tablets (30 mg) once daily for 3 days, THEN 2 tablets (20 mg) once daily for 3 days, THEN 1 tablet (10 mg) once daily for 3 days.              CONTINUE taking these medications        Instructions Last Dose Given Next Dose Due   ALPRAZolam XR 0.5 mg 24 hr tablet  Commonly known as: Xanax XR           calcium carbonate-vitamin D3 500 mg-5 mcg (200 unit) tablet           Dulera 200-5 mcg/actuation inhaler  Generic drug: mometasone-formoterol           loratadine 10 mg tablet  Commonly known as: Claritin           losartan 50 mg tablet  Commonly known as: Cozaar           oxyCODONE-acetaminophen 5-325 mg tablet  Commonly known as: Percocet           pantoprazole 40 mg EC tablet  Commonly  known as: ProtoNix           prednisoLONE acetate 1 % ophthalmic suspension  Commonly known as: Pred-Forte                  STOP taking these medications      levalbuterol 45 mcg/actuation inhaler  Commonly known as: Xopenex        valACYclovir 500 mg tablet  Commonly known as: Valtrex               ASK your doctor about these medications        Instructions Last Dose Given Next Dose Due   permethrin 5 % cream  Commonly known as: Elimite  Ask about: Should I take this medication?      Apply topically 1 time for 1 dose. Apply to skin from hairline to toes and wash off 8-10 hours later. Do not start before January 26, 2024.                 Where to Get Your Medications        You can get these medications from any pharmacy    Bring a paper prescription for each of these medications  permethrin 5 % cream  predniSONE 10 mg tablet       Information about where to get these medications is not yet available    Ask your nurse or doctor about these medications  amLODIPine 10 mg tablet  diphenhydrAMINE 25 mg capsule  doxycycline 100 mg capsule  lidocaine 4 % patch  lubricating eye drops ophthalmic solution  mineral oil-hydrophilic petrolatum ointment         Test Results Pending At Discharge  Pending Labs       Order Current Status    Ova and Parasite Examination In process    Ova/Para + Giardia/Cryptosporidium Antigen In process            Hospital Course   Raul is a 58-year-old male patient who is alert and oriented x 3 presenting to emergency department with a rash.  Patient states he has had this rash for the last several years but worsening over the last few weeks.  Patient states he had a rash like this years ago and followed up with a dermatologist, was prescribed medications which he never took.  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  Patient denies chest pain or dizziness.  Patient has a scaly, dry, cracking rash on his face, hands, feet, chest, and bilateral elbows.  Patient medicated with  Benadryl and prednisone p.o.  Patient admitted for further medical management.     Rash/failure to thrive  Suspected scabies  Head lice  Admit to observation per Dr. Roger Lacey  Prednisone p.o. daily  Benadryl x 2 IV in ED  Continue IV fluids  Urinalysis pending  Labs unremarkable  Aquaphor cream as needed  PT/OT  Social work consult for discharge planning  Repeat labs in am  Permethrin cream for skin  Permethrin liquid for scalp  ID consulted, appreciate recs  HIV screening per ID recs     GERD/hypertension/hyperlipidemia/COPD/eczema/HSV of the eye/TBI/anxiety  Continue home medications  Cardiac diet  IV Metoprolol X 1 for htn     DVT Ppx  SCDs  Activity as tolerated     1/20: Patient is status post treatment with permethrin for suspected scabies and head lice.  He has pruritus and formication have improved.  Blood pressures remain elevated, increase amlodipine no started yesterday.  Patient medically ready for discharge to SNF.     1/21: HIV negative.  Awaiting pre-[facility] nursing facility.  Outpatient dermatology follow-up.     1/22: Steroids changed to solumedrol. Continue moisturizers. Patient ok to shower with unscented soap. Started on Vancomycin. Permethrin again Friday. Awaiting precert to SNF.     1/24: Patient with increasing weakness. Discussed potential for steroid-induced myopathy. Patient does not believe this is the case and wants to continue steroids. Patient has been cleared by ID. Unfortunately, SNF protocol requires a second treatment of permethrin. Patient too weak to discharge home. He will discharge Friday or Saturday after his treatment. Will check stool for parasites but low suspicion given patient is not losing weight.      1/25: Leukocytosis secondary to steroids.  Continue current management.  Stool for ova and parasites pending.  Permethrin treatment tomorrow.  Lidoderm patch for shoulder pain.  Anticipate discharge to SNF in 24 to 48 hours.     1/26: Stool studies pending. Patient  seen and examined at bedside. Second permethrin treatment today. Discharge to SNF tomorrow.     1/27: Second permethrin treatment applied today.  Patient will discharge to SNF tomorrow.  Stool studies still pending.     1/28: Second permethrin treatment complete. Stool study still pending at time at discharge, will need to be follow up on at SNF.    Pertinent Physical Exam At Time of Discharge  Physical Exam  HENT:      Mouth/Throat:      Mouth: Mucous membranes are moist.      Pharynx: Oropharynx is clear.   Eyes:      Pupils: Pupils are equal, round, and reactive to light.   Cardiovascular:      Rate and Rhythm: Normal rate and regular rhythm.   Pulmonary:      Effort: Pulmonary effort is normal.      Breath sounds: Normal breath sounds.   Abdominal:      General: Bowel sounds are normal.      Palpations: Abdomen is soft.   Musculoskeletal:         General: Normal range of motion.      Cervical back: Normal range of motion.   Skin:     General: Skin is warm.      Comments: Scaly, dry, cracking rash on face/hands/feet, elbows, and chest   Neurological:      General: No focal deficit present.      Mental Status: He is alert and oriented to person, place, and time.   Psychiatric:         Mood and Affect: Mood normal.         Behavior: Behavior normal.      Outpatient Follow-Up  No future appointments.      Roger Lacey,

## 2024-01-30 LAB — O+P STL MICRO: NEGATIVE

## 2024-10-31 ENCOUNTER — CLINICAL SUPPORT (OUTPATIENT)
Dept: EMERGENCY MEDICINE | Facility: HOSPITAL | Age: 59
End: 2024-10-31
Payer: COMMERCIAL

## 2024-10-31 ENCOUNTER — HOSPITAL ENCOUNTER (OUTPATIENT)
Facility: HOSPITAL | Age: 59
Setting detail: OBSERVATION
Discharge: HOME | End: 2024-11-02
Attending: EMERGENCY MEDICINE
Payer: COMMERCIAL

## 2024-10-31 ENCOUNTER — APPOINTMENT (OUTPATIENT)
Dept: RADIOLOGY | Facility: HOSPITAL | Age: 59
End: 2024-10-31
Payer: COMMERCIAL

## 2024-10-31 DIAGNOSIS — Z22.322 MRSA (METHICILLIN RESISTANT STAPH AUREUS) CULTURE POSITIVE: ICD-10-CM

## 2024-10-31 DIAGNOSIS — R21 RASH: Primary | ICD-10-CM

## 2024-10-31 LAB
ALBUMIN SERPL BCP-MCNC: 4.4 G/DL (ref 3.4–5)
ALP SERPL-CCNC: 60 U/L (ref 33–136)
ALT SERPL W P-5'-P-CCNC: 25 U/L (ref 10–52)
ANION GAP SERPL CALC-SCNC: 16 MMOL/L (ref 10–20)
AST SERPL W P-5'-P-CCNC: 21 U/L (ref 9–39)
BASOPHILS # BLD AUTO: 0.08 X10*3/UL (ref 0–0.1)
BASOPHILS NFR BLD AUTO: 0.7 %
BILIRUB SERPL-MCNC: 0.5 MG/DL (ref 0–1.2)
BUN SERPL-MCNC: 27 MG/DL (ref 6–23)
CALCIUM SERPL-MCNC: 9.6 MG/DL (ref 8.6–10.6)
CHLORIDE SERPL-SCNC: 103 MMOL/L (ref 98–107)
CO2 SERPL-SCNC: 24 MMOL/L (ref 21–32)
CREAT SERPL-MCNC: 1.19 MG/DL (ref 0.5–1.3)
EGFRCR SERPLBLD CKD-EPI 2021: 69 ML/MIN/1.73M*2
EOSINOPHIL # BLD AUTO: 0.69 X10*3/UL (ref 0–0.7)
EOSINOPHIL NFR BLD AUTO: 5.6 %
ERYTHROCYTE [DISTWIDTH] IN BLOOD BY AUTOMATED COUNT: 14.1 % (ref 11.5–14.5)
GLUCOSE SERPL-MCNC: 90 MG/DL (ref 74–99)
HCT VFR BLD AUTO: 39.7 % (ref 41–52)
HGB BLD-MCNC: 13.8 G/DL (ref 13.5–17.5)
IMM GRANULOCYTES # BLD AUTO: 0.08 X10*3/UL (ref 0–0.7)
IMM GRANULOCYTES NFR BLD AUTO: 0.7 % (ref 0–0.9)
LACTATE SERPL-SCNC: 2.3 MMOL/L (ref 0.4–2)
LYMPHOCYTES # BLD AUTO: 1.65 X10*3/UL (ref 1.2–4.8)
LYMPHOCYTES NFR BLD AUTO: 13.4 %
MCH RBC QN AUTO: 31.1 PG (ref 26–34)
MCHC RBC AUTO-ENTMCNC: 34.8 G/DL (ref 32–36)
MCV RBC AUTO: 89 FL (ref 80–100)
MONOCYTES # BLD AUTO: 1.39 X10*3/UL (ref 0.1–1)
MONOCYTES NFR BLD AUTO: 11.3 %
NEUTROPHILS # BLD AUTO: 8.41 X10*3/UL (ref 1.2–7.7)
NEUTROPHILS NFR BLD AUTO: 68.3 %
NRBC BLD-RTO: 0 /100 WBCS (ref 0–0)
PLATELET # BLD AUTO: 403 X10*3/UL (ref 150–450)
POTASSIUM SERPL-SCNC: 4.4 MMOL/L (ref 3.5–5.3)
PROT SERPL-MCNC: 7 G/DL (ref 6.4–8.2)
RBC # BLD AUTO: 4.44 X10*6/UL (ref 4.5–5.9)
SODIUM SERPL-SCNC: 139 MMOL/L (ref 136–145)
WBC # BLD AUTO: 12.3 X10*3/UL (ref 4.4–11.3)

## 2024-10-31 PROCEDURE — 80053 COMPREHEN METABOLIC PANEL: CPT | Performed by: EMERGENCY MEDICINE

## 2024-10-31 PROCEDURE — 93005 ELECTROCARDIOGRAM TRACING: CPT

## 2024-10-31 PROCEDURE — 36415 COLL VENOUS BLD VENIPUNCTURE: CPT | Performed by: EMERGENCY MEDICINE

## 2024-10-31 PROCEDURE — 85025 COMPLETE CBC W/AUTO DIFF WBC: CPT | Performed by: EMERGENCY MEDICINE

## 2024-10-31 PROCEDURE — 99285 EMERGENCY DEPT VISIT HI MDM: CPT

## 2024-10-31 PROCEDURE — 71046 X-RAY EXAM CHEST 2 VIEWS: CPT

## 2024-10-31 PROCEDURE — 99285 EMERGENCY DEPT VISIT HI MDM: CPT | Performed by: EMERGENCY MEDICINE

## 2024-10-31 PROCEDURE — 83605 ASSAY OF LACTIC ACID: CPT | Performed by: EMERGENCY MEDICINE

## 2024-10-31 PROCEDURE — 71046 X-RAY EXAM CHEST 2 VIEWS: CPT | Mod: FOREIGN READ | Performed by: RADIOLOGY

## 2024-10-31 ASSESSMENT — COLUMBIA-SUICIDE SEVERITY RATING SCALE - C-SSRS
1. IN THE PAST MONTH, HAVE YOU WISHED YOU WERE DEAD OR WISHED YOU COULD GO TO SLEEP AND NOT WAKE UP?: NO
2. HAVE YOU ACTUALLY HAD ANY THOUGHTS OF KILLING YOURSELF?: NO
6. HAVE YOU EVER DONE ANYTHING, STARTED TO DO ANYTHING, OR PREPARED TO DO ANYTHING TO END YOUR LIFE?: NO

## 2024-10-31 ASSESSMENT — PAIN SCALES - GENERAL: PAINLEVEL_OUTOF10: 10 - WORST POSSIBLE PAIN

## 2024-10-31 ASSESSMENT — PAIN - FUNCTIONAL ASSESSMENT: PAIN_FUNCTIONAL_ASSESSMENT: 0-10

## 2024-10-31 ASSESSMENT — PAIN DESCRIPTION - LOCATION: LOCATION: NECK

## 2024-10-31 ASSESSMENT — PAIN DESCRIPTION - PAIN TYPE: TYPE: CHRONIC PAIN

## 2024-11-01 PROBLEM — R21 RASH: Status: ACTIVE | Noted: 2024-11-01

## 2024-11-01 LAB
APPEARANCE UR: CLEAR
ATRIAL RATE: 93 BPM
BILIRUB UR STRIP.AUTO-MCNC: NEGATIVE MG/DL
COLOR UR: YELLOW
GLUCOSE UR STRIP.AUTO-MCNC: NORMAL MG/DL
KETONES UR STRIP.AUTO-MCNC: NEGATIVE MG/DL
LEUKOCYTE ESTERASE UR QL STRIP.AUTO: NEGATIVE
NITRITE UR QL STRIP.AUTO: NEGATIVE
P AXIS: 59 DEGREES
P OFFSET: 188 MS
P ONSET: 154 MS
PH UR STRIP.AUTO: 5 [PH]
PR INTERVAL: 142 MS
PROT UR STRIP.AUTO-MCNC: NEGATIVE MG/DL
Q ONSET: 225 MS
QRS COUNT: 15 BEATS
QRS DURATION: 92 MS
QT INTERVAL: 362 MS
QTC CALCULATION(BAZETT): 450 MS
QTC FREDERICIA: 419 MS
R AXIS: 61 DEGREES
RBC # UR STRIP.AUTO: NEGATIVE /UL
SP GR UR STRIP.AUTO: 1.02
T AXIS: 51 DEGREES
T OFFSET: 406 MS
UROBILINOGEN UR STRIP.AUTO-MCNC: NORMAL MG/DL
VENTRICULAR RATE: 93 BPM

## 2024-11-01 PROCEDURE — 2500000001 HC RX 250 WO HCPCS SELF ADMINISTERED DRUGS (ALT 637 FOR MEDICARE OP): Mod: SE

## 2024-11-01 PROCEDURE — 96374 THER/PROPH/DIAG INJ IV PUSH: CPT

## 2024-11-01 PROCEDURE — 2500000005 HC RX 250 GENERAL PHARMACY W/O HCPCS: Mod: SE

## 2024-11-01 PROCEDURE — 2500000002 HC RX 250 W HCPCS SELF ADMINISTERED DRUGS (ALT 637 FOR MEDICARE OP, ALT 636 FOR OP/ED): Mod: SE

## 2024-11-01 PROCEDURE — 81003 URINALYSIS AUTO W/O SCOPE: CPT | Performed by: EMERGENCY MEDICINE

## 2024-11-01 PROCEDURE — 99245 OFF/OP CONSLTJ NEW/EST HI 55: CPT | Performed by: STUDENT IN AN ORGANIZED HEALTH CARE EDUCATION/TRAINING PROGRAM

## 2024-11-01 PROCEDURE — 84630 ASSAY OF ZINC: CPT

## 2024-11-01 PROCEDURE — 82550 ASSAY OF CK (CPK): CPT

## 2024-11-01 PROCEDURE — G0378 HOSPITAL OBSERVATION PER HR: HCPCS

## 2024-11-01 PROCEDURE — 2500000004 HC RX 250 GENERAL PHARMACY W/ HCPCS (ALT 636 FOR OP/ED): Mod: SE | Performed by: PHYSICIAN ASSISTANT

## 2024-11-01 PROCEDURE — 36415 COLL VENOUS BLD VENIPUNCTURE: CPT

## 2024-11-01 PROCEDURE — 87077 CULTURE AEROBIC IDENTIFY: CPT

## 2024-11-01 PROCEDURE — 82085 ASSAY OF ALDOLASE: CPT

## 2024-11-01 PROCEDURE — 86038 ANTINUCLEAR ANTIBODIES: CPT

## 2024-11-01 PROCEDURE — 84591 ASSAY OF NOS VITAMIN: CPT

## 2024-11-01 PROCEDURE — 2500000001 HC RX 250 WO HCPCS SELF ADMINISTERED DRUGS (ALT 637 FOR MEDICARE OP): Mod: SE | Performed by: PHYSICIAN ASSISTANT

## 2024-11-01 RX ORDER — PANTOPRAZOLE SODIUM 20 MG/1
TABLET, DELAYED RELEASE ORAL
Status: DISPENSED
Start: 2024-11-01 | End: 2024-11-02

## 2024-11-01 RX ORDER — OXYCODONE AND ACETAMINOPHEN 5; 325 MG/1; MG/1
1 TABLET ORAL EVERY 8 HOURS PRN
Status: COMPLETED | OUTPATIENT
Start: 2024-11-01 | End: 2024-11-01

## 2024-11-01 RX ORDER — PANTOPRAZOLE SODIUM 40 MG/1
40 TABLET, DELAYED RELEASE ORAL
Status: DISCONTINUED | OUTPATIENT
Start: 2024-11-01 | End: 2024-11-02 | Stop reason: HOSPADM

## 2024-11-01 RX ORDER — TRIAMCINOLONE ACETONIDE 1 MG/G
CREAM TOPICAL 2 TIMES DAILY
Status: DISCONTINUED | OUTPATIENT
Start: 2024-11-01 | End: 2024-11-02 | Stop reason: HOSPADM

## 2024-11-01 RX ORDER — ERYTHROMYCIN 5 MG/G
1 OINTMENT OPHTHALMIC NIGHTLY
Status: DISCONTINUED | OUTPATIENT
Start: 2024-11-01 | End: 2024-11-02 | Stop reason: HOSPADM

## 2024-11-01 RX ORDER — DIPHENHYDRAMINE HCL 25 MG
25 CAPSULE ORAL ONCE
Status: COMPLETED | OUTPATIENT
Start: 2024-11-01 | End: 2024-11-01

## 2024-11-01 RX ORDER — HYDROCORTISONE 1 %
CREAM (GRAM) TOPICAL 2 TIMES DAILY
Status: DISCONTINUED | OUTPATIENT
Start: 2024-11-01 | End: 2024-11-02 | Stop reason: HOSPADM

## 2024-11-01 RX ORDER — DIPHENHYDRAMINE HCL 25 MG
50 CAPSULE ORAL EVERY 6 HOURS PRN
Status: COMPLETED | OUTPATIENT
Start: 2024-11-01 | End: 2024-11-02

## 2024-11-01 RX ORDER — PANTOPRAZOLE SODIUM 20 MG/1
TABLET, DELAYED RELEASE ORAL
Status: COMPLETED
Start: 2024-11-01 | End: 2024-11-01

## 2024-11-01 ASSESSMENT — ENCOUNTER SYMPTOMS
FEVER: 0
SHORTNESS OF BREATH: 1
UNEXPECTED WEIGHT CHANGE: 1
CHILLS: 0
APPETITE CHANGE: 1
EYE REDNESS: 1

## 2024-11-01 ASSESSMENT — LIFESTYLE VARIABLES
EVER HAD A DRINK FIRST THING IN THE MORNING TO STEADY YOUR NERVES TO GET RID OF A HANGOVER: NO
HAVE YOU EVER FELT YOU SHOULD CUT DOWN ON YOUR DRINKING: NO
EVER FELT BAD OR GUILTY ABOUT YOUR DRINKING: NO
TOTAL SCORE: 0
HAVE PEOPLE ANNOYED YOU BY CRITICIZING YOUR DRINKING: NO

## 2024-11-01 ASSESSMENT — PAIN SCALES - GENERAL
PAINLEVEL_OUTOF10: 0 - NO PAIN
PAINLEVEL_OUTOF10: 9

## 2024-11-01 ASSESSMENT — PAIN DESCRIPTION - LOCATION: LOCATION: SHOULDER

## 2024-11-01 NOTE — ED TRIAGE NOTES
PT presents to ED via triage for chief complaint of rash. PT states he was seen by his dermatologist earlier this week and has a history of neutrophilic dermatitis. PT states he has decreased vision in both eyes, generalized weakness, generalized soreness and a 5-6 pound weightloss in the past 1-2 weeks. PT is A[employer].

## 2024-11-01 NOTE — H&P
History and Physical  [facility] EMERGENCY MEDICINE  Patient: Raul Jules  MRN: [id number]  : 1965  Date of Evaluation: 10/31/2024  ED Provider: DON Zamora      Limitations to history: None  Independent Historian: Yes  External Records Reviewed: Yes      Patient History:  Raul Jules is a 59 y.o. male with past medical history significant for Chronic severe dermatitis, Eczema, Asthma, TBI, and COPD, admitted to the clinical decision unit for Rash.  Patient evaluated in the emergency department for concern of bilateral blurriness of vision, with associated floaters since having skin rash started.  Patient states started experiencing sensation of itchiness, rash and sensation of bugs crawling on him on Saturday.  States symptoms worsen with seen by [facility] on Monday 10/28/24, who performed a biopsy, results not to be available for 10 to 15 days.  Patient states since then symptoms have worsen, especially at night.  He reports weight loss of approximately 24 pounds, weakness and feels dehydrated. He believes his skin very dry which is causing cracking and bleeding.  He denies SOB, CP, HA, dizziness, fevers, recent illnesses.      The acute medial evaluation while in the Emergency Department consist of an EKG, labs and Imagining.  ->CBC: Slight elevated WBC at 12.3  ->CMP: Elevated BUN, improved from previous baseline 9 months ago.   ->Lactate: Elevated at 2.3  -> UA: Unremarkable     Ophthalmology consulted, recommended:  - Visual acuity 20/200 OD, 20/30 OS (Consistent with prior documented exam from )  - Recommend artificial tears TID OU  - Recommend erythromycin ointment at bedtime OU   - Patient offered follow up eye appointment with , however he declines and says he has eye doctor he sees and will continue to see upon discharge.    After discussion with the ED provider, a decision was made to admit the patient to the Clinical Decision Unit for Rash.  Plan is to  consult Dermatology.      In the emergency department, the acute evaluation included:  Orders Placed This Encounter   Procedures    XR chest 2 views    CBC with Differential    Comprehensive Metabolic Panel    Lactate    Urinalysis with Reflex Microscopic    Lactate    Communication - Prep IV site    NIPP Communication Suspected Sepsis    Communication - Call Physician for BP    Communication - Call Physician    Vital signs    Inpatient consult to Dermatology    ECG 12 lead    Insert and maintain peripheral IV    Initiate Observation Send to CDU    Initiate observation status       I reviewed the below labs and imaging as ordered by the ED provider:  XR chest 2 views   Final Result   No acute abnormality.   Signed by Boom Mullins MD          Labs Reviewed   CBC WITH AUTO DIFFERENTIAL - Abnormal       Result Value    WBC 12.3 (*)     nRBC 0.0      RBC 4.44 (*)     Hemoglobin 13.8      Hematocrit 39.7 (*)     MCV 89      MCH 31.1      MCHC 34.8      RDW 14.1      Platelets 403      Neutrophils % 68.3      Immature Granulocytes %, Automated 0.7      Lymphocytes % 13.4      Monocytes % 11.3      Eosinophils % 5.6      Basophils % 0.7      Neutrophils Absolute 8.41 (*)     Immature Granulocytes Absolute, Automated 0.08      Lymphocytes Absolute 1.65      Monocytes Absolute 1.39 (*)     Eosinophils Absolute 0.69      Basophils Absolute 0.08     COMPREHENSIVE METABOLIC PANEL - Abnormal    Glucose 90      Sodium 139      Potassium 4.4      Chloride 103      Bicarbonate 24      Anion Gap 16      Urea Nitrogen 27 (*)     Creatinine 1.19      eGFR 69      Calcium 9.6      Albumin 4.4      Alkaline Phosphatase 60      Total Protein 7.0      AST 21      Bilirubin, Total 0.5      ALT 25     LACTATE - Abnormal    Lactate 2.3 (*)     Narrative:     Venipuncture immediately after or during the administration of Metamizole may lead to falsely low results. Testing should be performed immediately prior to Metamizole dosing.    URINALYSIS WITH REFLEX MICROSCOPIC - Normal    Color, Urine Yellow      Appearance, Urine Clear      Specific Gravity, Urine 1.024      pH, Urine 5.0      Protein, Urine NEGATIVE      Glucose, Urine Normal      Blood, Urine NEGATIVE      Ketones, Urine NEGATIVE      Bilirubin, Urine NEGATIVE      Urobilinogen, Urine Normal      Nitrite, Urine NEGATIVE      Leukocyte Esterase, Urine NEGATIVE     LACTATE       Upon admission to the Clinical Decision Unit,  Raul Jules is alert and oriented and appears in no acute distress. He reports extreme itching all over, He denies SOB, CP, HA, dizziness, fevers, recent illnesses.  Vital signs were reviewed. Dermatology consulted. Patient updated on plan of care.     Past History   History reviewed. No pertinent past medical history.  History reviewed. No pertinent surgical history.  Social History     Socioeconomic History    Marital status: Single   Tobacco Use    Smoking status: Never    Smokeless tobacco: Never   Substance and Sexual Activity    Alcohol use: Never    Drug use: Defer    Sexual activity: Defer     Social Drivers of Health     Financial Resource Strain: Patient Declined (1/19/2024)    Overall Financial Resource Strain (CARDIA)     Difficulty of Paying Living Expenses: Patient declined   Transportation Needs: Patient Declined (1/19/2024)    PRAPARE - Transportation     Lack of Transportation (Medical): Patient declined     Lack of Transportation (Non-Medical): Patient declined   Housing Stability: Patient Declined (1/19/2024)    Housing Stability Vital Sign     Unable to Pay for Housing in the Last Year: Patient declined     Number of Places Lived in the Last Year: 0     Unstable Housing in the Last Year: Patient declined         Medications/Allergies     Previous Medications    ALPRAZOLAM XR (XANAX XR) 0.5 MG 24 HR TABLET    Take 1 tablet (0.5 mg) by mouth once daily in the morning. Do not crush, chew, or split.    AMLODIPINE (NORVASC) 10 MG TABLET    Take 1  tablet (10 mg) by mouth once daily. Do not start before January 21, 2024.    CALCIUM CARBONATE-VITAMIN D3 500 MG-5 MCG (200 UNIT) TABLET    Take 1 tablet by mouth once daily.    DIPHENHYDRAMINE (BENADRYL) 25 MG CAPSULE    Take 1 capsule (25 mg) by mouth every 6 hours if needed for itching.    LIDOCAINE 4 % PATCH    Place 1 patch over 12 hours on the skin once daily. Remove & discard patch within 12 hours or as directed by MD.    LORATADINE (CLARITIN) 10 MG TABLET    Take 1 tablet (10 mg) by mouth once daily.    LOSARTAN (COZAAR) 50 MG TABLET    Take 2 tablets (100 mg) by mouth once daily.    LUBRICATING EYE DROPS OPHTHALMIC SOLUTION    Administer 2 drops into both eyes 3 times a day as needed for dry eyes.    MINERAL OIL-HYDROPHILIC PETROLATUM (AQUAPHOR) OINTMENT    Apply topically 2 times a day.    MOMETASONE-FORMOTEROL (DULERA) 200-5 MCG/ACTUATION INHALER    Inhale 2 puffs 2 times a day. Rinse mouth with water after use to reduce aftertaste and incidence of candidiasis. Do not swallow.    OXYCODONE-ACETAMINOPHEN (PERCOCET) 5-325 MG TABLET    Take 1 tablet by mouth every 8 hours if needed for severe pain (7 - 10).    PANTOPRAZOLE (PROTONIX) 40 MG EC TABLET    Take 20 mg by mouth once daily in the morning. Take before meals. Do not crush, chew, or split.    PREDNISOLONE ACETATE (PRED-FORTE) 1 % OPHTHALMIC SUSPENSION    Administer 1 drop into both eyes 4 times a day.     Allergies   Allergen Reactions    Propranolol Shortness of breath    Albuterol Palpitations and Other    Amoxicillin-Pot Clavulanate Unknown    Cat Hair Standardized Allergenic Extract Itching    Hydrocodone-Acetaminophen GI Upset and Swelling    Latex Hives    Levofloxacin Other    Lorazepam Unknown    Shrimp Other    Sulfamethoxazole-Trimethoprim GI Upset    Hydroxyzine Other             Review of systems:  All systems reviewed and otherwise negative, except as stated above in HPI.        Physical Exam     Visit Vitals  /87 (BP Location:  "Right arm)   Pulse 80   Temp 36.6 °C (97.9 °F) (Temporal)   Resp 14   Ht 1.753 m (5' 9\")   Wt 78.9 kg (174 lb)   SpO2 98%   BMI 25.70 kg/m²   Smoking Status Never   BSA 1.96 m²         Constitutional:  Appearance: Normal appearance.    HENT:   Head:  Head normocephalic, atraumatic  Nose: Nares patent.  Mouth/throat: No swelling, erythema  Mouth:  Mucous membranes are moist.  No oral lesions.  Findings: Fissuring of the lip.     Eyes: EOMs intact    Pulmonary:  Breathing pattern   Able to speak full sentences, no accessory muscle use.  Effort: Pulmonary effort is normal    Cardiovascular:  Regular rate and rhythm: Normal rate and rhythm.    Gastrointestinal: Abdomen is soft     Musculoskeletal: Symmetrical muscle bulk. No peripheral edema.  Pulses intact distal.  Able to walk.    Integumentary:   General: Warm, dry   Findings: Erythematous scaly patches to the face, bilateral upper extremities, trunk and groin.  There are scattered crusted erosions on the face and neck.  There are fissuring of the hands with magnification.  There are erythematous patches bilaterally to the lower legs.    Neurological:    General: No focal deficit present   Mental status: He is alert and oriented to person place and time.      Psychiatric:   Mood and affect: Calm and cooperative.     Consultants:  1.) Dermatology            Impression and Plan  In summary, Raul Jules is admitted to the [facility] Clinical Decision Unit for Rash. Dr. Delroy Barney is the CDU admission attending.    This patient has been risk-stratified based on available history, physical exam, and related study findings. Admission to the observation status for further diagnosis/treatment/monitoring of Rash is warranted clinically. This extended period of observation is specifically required to determine the need for hospitalization.     The goals of this admission based on the patient’s clinical problem list are:   1.) Rash  -Monitor VS  - " Dermatology consult  -Benadryl 25mg PO x1.    2.) Blurry vision  -Artificial tears 3 times daily  -Erythromycin ointment nightly      We will observe the patient for the following endpoints:   1.) Stable vitals  2.)  Symptomatic improvement.  3.)  Cleared by dermatology    When met, appropriate disposition will be arranged.    Lani Hardin, LAURENCE-CNP  [facility]  Clinical Decision Unit  [place] [phone]

## 2024-11-01 NOTE — CONSULTS
Reason For Consult  Blurry vision both eyes.    History Of Present Illness  Raul Jules is a 59 y.o. male with a history of neutrophilic dermatitis presenting with diffuse body rash since this past weekend (Oct 26-27). Patient notes that his vision has been subjectively blurry OU since the rash started.    Notably patient has followed with Dr. 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 Patient states he continues to take Valtrex. On last visit available upon chart review in 2022, patient's visual acuity was 20/200 OD and 20/30 OS and at that time was noted to have sealed corneal perforation OD and scarring OU.     Today patient reports that his vision has been blurry in both eyes since his skin rash started this past weekend. He reports he is very dehydrated and his skin is very dry and is cracking/bleeding due to the dryness. Patient reports that both eyes have chronic flashes and floaters, and the right eye has a chronic floater in his central vision.     Today patient declines both numbing and dilating eye drops. Patient [place] and was unable to be seen by a dermatologist for his skin condition.      Past Medical History  He has no past medical history on file.    Physical Exam  Base Eye Exam       Visual Acuity (Snellen - Linear)         Right Left    Near cc 20/200 PHNI 20/30 PHNI              Tonometry (Tonopen, 3:06 AM)         Right Left    Pressure 15 14              Pupils         Dark Light Shape React APD    Right 4 3 Round Brisk -    Left 4 3 Round Brisk -              Visual Fields         Left Right                                    Slit Lamp and Fundus Exam       External Exam         Right Left    External dry, crusted skin with cracks and tr bleeding across face dry, crusted skin with cracks and tr bleeding across face              Slit Lamp Exam         Right Left    Lids/Lashes Blepharitis Blepharitis  "   Conjunctiva/Sclera White and quiet White and quiet    Cornea Prior temporal perforation with no surface defects and pigmented iris prolapsed into prior defect. Hazy stromal scars throughout. Hazy stromal scars throughout.    Anterior Chamber Deep and quiet Deep and quiet    Iris Dragged towards prior K perforation Round and reactive    Lens Posterior chamber intraocular lens Posterior chamber intraocular lens                     Last Recorded Vitals  Blood pressure 124/87, pulse 80, temperature 36.6 °C (97.9 °F), temperature source Temporal, resp. rate 14, height 1.753 m (5' 9\"), weight 78.9 kg (174 lb), SpO2 98%.       Assessment/Plan     #Prior corneal perforation OD  #Hx of recurrent HSV keratitis  - Exam today limited by inability to stain cornea with fluorescein due to patient declining any eye drops or other topical application, however, surfaces do not appear to have active epithelial lesions, anterior chamber (AC) is quiet OU, and exam seems consistent with prior documented exam from 2022, including asymmetric visual acuity (20/200 OD, 20/30 OS). Patient additionally declined DFE.   - Will have patient to continue to follow with his cornea provider outpatient as he prefers.  - Patient plans to continue to take Valtrex.     #Dry eye OU  - Recommend artificial tears TID OU  - Recommend erythromycin ointment at bedtime OU    - Patient offered follow up eye appointment with , however he declines and says he has eye doctor he sees and will continue to see upon discharge.    Jordy Mullins MD  PGY2 - Ophthalmology     Ophthalmology Adult Pager - [phone]  Ophthalmology Pediatrics Pager - [phone]    For adult follow-up appointments, call: [phone]  For pediatric follow-up appointments, call: [phone]    NOTE: This note is not finalized until attending reviews and signs.   "

## 2024-11-01 NOTE — CONSULTS
"Inpatient consult to Dermatology  Consult performed by: Mickie Donnelly DO  Consult ordered by: LAURENCE Zamora-DARLIN      DERMATOLOGY DEPARTMENT CONSULTATION NOTE  Name: Raul Jules  MRN: [id number]  : 1965    Reason for consultation: Rash    History of Present Illness  Raul Jules is a 59 y.o. male with a past medical history of TBI, atopic dermatitis, asthma, allergic rhinitis, actinic keratosis, COPD, and HSV keratitis presented on 10/31/2024 with rash and bilateral vision changes, and was admitted to clinical decision unit. Dermatology was consulted for evaluation of the rash.    Patient reports history of \"skin disorder\" with red and itchy skin for many years. He came into the hospital today as he feels as though there are bugs crawling out of his skin on his face. He is very worried that this is infectious. He reports this started approximately 3 weeks ago when he was visiting his dad in the hospital. He feels that the bugs are more active at night. During examination, he requested a magnifying glass and black light to help visualize the bugs crawling out of his skin. He reports that he lives with a friend who is unaffected. His father is also not affected.     Patient previously evaluated by dermatology at multiple institutions with 3 separate biopsies:   --Evaluated by dermatology at [facility] Dr. Oma Rico on 2016.  Patient had biopsy 2016 showed mild subacute spongiotic dermatitis. No evidence of psoriasiform dermatitis or features of psoriasis.   --Seen inpatient by  dermatology Dr. Burch with two biopsies showing  psoriasiform and spongiotic dermatitis.  --Patient previously evaluated by Dr. Bridges 2023. Biopsy showed subacute spongiotic dermatitis with eosinophils. ARACELY was not completed, but JUAN was negative.     Most recently, patient was seen by [facility] in [place]. Patient last seen 10/28/24. BSA was reported to be 20%. Patient had two biopsies on " right superior upper back (punch) and right upper cutaneous lip (shave). Patient was prescribed topical permethrin 5% cream and recommended to apply it to full body once and repeat 1 week later. Patient reports that he applied it once with burning/stinging sensation. He is currently not using any additional topical or oral medications for his rash.     Patient also endorses decreased appetite, weight loss of 24 pounds (unknown time course), and weakness standing up and lifting hands above his head. Patient states he is not up to date on routine cancer screenings. He denies any new medications, supplements, or personal care products. Denies family history of any skin conditions including eczema and psoriasis.       Review of Systems  Review of Systems   Constitutional:  Positive for appetite change and unexpected weight change. Negative for chills and fever.   Eyes:  Positive for redness and visual disturbance.   Respiratory:  Positive for shortness of breath.    Musculoskeletal:         Weakness   Skin:  Positive for rash.        Past Medical History  History reviewed. No pertinent past medical history.    Past Surgical History   has no past surgical history on file.     Allergies  Allergies   Allergen Reactions    Propranolol Shortness of breath    Albuterol Palpitations and Other    Amoxicillin-Pot Clavulanate Unknown    Cat Hair Standardized Allergenic Extract Itching    Hydrocodone-Acetaminophen GI Upset and Swelling    Latex Hives    Levofloxacin Other    Lorazepam Unknown    Shrimp Other    Sulfamethoxazole-Trimethoprim GI Upset    Hydroxyzine Other       Medications  Scheduled Meds:    Continuous Infusions:    PRN Meds:      Family History  No family history on file.    Social History   reports that he has never smoked. He has never used smokeless tobacco. Drug use questions deferred to the physician. He reports that he does not drink alcohol.     Objective    Vitals:    11/01/24 0120 11/01/24 0606 11/01/24  0843 11/01/24 1236   BP: 124/87 139/81 157/88 (!) 129/94   BP Location: Right arm Left arm     Patient Position:  Standing     Pulse: 80 95 95 89   Resp: 14 16 19 18   Temp:  36.8 °C (98.2 °F) 36.6 °C (97.9 °F) 36.3 °C (97.3 °F)   TempSrc:  Oral Temporal Temporal   SpO2: 98% 98% 95% 97%   Weight:       Height:            Exam    GEN: no acute distress  NEURO: moving all extremities   EYES: conjunctiva and eyelids normal. No conjunctival injection or erosions appreciated  ENT:   - Lips: fissuring of the lips  NECK: normal and symmetric.   CV: no varicosities, warmth or tenderness of extremities.  GI: Flat abdomen.   EXTREMITIES: no distal digital clubbing, cyanosis, petechiae   SKIN: A full body skin exam including scalp, face, eyes, ears, neck, trunk, bilateral upper & lower extremities, toenails and fingernails were examined with the following findings:  -On the face, bilateral upper extremities, trunk and groin, there are erythematous scaly patches. Lichenification of face, neck, and bilateral hands/feet.  -Scattered crusted erosions on the face and neck.   -Fissuring of the hands with lichenification.   -On the bilateral lower legs, there are erythematous papules coalescing into plaques with some central islands of sparing.     Laboratory and Data  Results for orders placed or performed during the hospital encounter of 10/31/24 (from the past 24 hours)   CBC with Differential   Result Value Ref Range    WBC 12.3 (H) 4.4 - 11.3 x10*3/uL    nRBC 0.0 0.0 - 0.0 /100 WBCs    RBC 4.44 (L) 4.50 - 5.90 x10*6/uL    Hemoglobin 13.8 13.5 - 17.5 g/dL    Hematocrit 39.7 (L) 41.0 - 52.0 %    MCV 89 80 - 100 fL    MCH 31.1 26.0 - 34.0 pg    MCHC 34.8 32.0 - 36.0 g/dL    RDW 14.1 11.5 - 14.5 %    Platelets 403 150 - 450 x10*3/uL    Neutrophils % 68.3 40.0 - 80.0 %    Immature Granulocytes %, Automated 0.7 0.0 - 0.9 %    Lymphocytes % 13.4 13.0 - 44.0 %    Monocytes % 11.3 2.0 - 10.0 %    Eosinophils % 5.6 0.0 - 6.0 %     Basophils % 0.7 0.0 - 2.0 %    Neutrophils Absolute 8.41 (H) 1.20 - 7.70 x10*3/uL    Immature Granulocytes Absolute, Automated 0.08 0.00 - 0.70 x10*3/uL    Lymphocytes Absolute 1.65 1.20 - 4.80 x10*3/uL    Monocytes Absolute 1.39 (H) 0.10 - 1.00 x10*3/uL    Eosinophils Absolute 0.69 0.00 - 0.70 x10*3/uL    Basophils Absolute 0.08 0.00 - 0.10 x10*3/uL   Comprehensive Metabolic Panel   Result Value Ref Range    Glucose 90 74 - 99 mg/dL    Sodium 139 136 - 145 mmol/L    Potassium 4.4 3.5 - 5.3 mmol/L    Chloride 103 98 - 107 mmol/L    Bicarbonate 24 21 - 32 mmol/L    Anion Gap 16 10 - 20 mmol/L    Urea Nitrogen 27 (H) 6 - 23 mg/dL    Creatinine 1.19 0.50 - 1.30 mg/dL    eGFR 69 >60 mL/min/1.73m*2    Calcium 9.6 8.6 - 10.6 mg/dL    Albumin 4.4 3.4 - 5.0 g/dL    Alkaline Phosphatase 60 33 - 136 U/L    Total Protein 7.0 6.4 - 8.2 g/dL    AST 21 9 - 39 U/L    Bilirubin, Total 0.5 0.0 - 1.2 mg/dL    ALT 25 10 - 52 U/L   Lactate   Result Value Ref Range    Lactate 2.3 (H) 0.4 - 2.0 mmol/L   ECG 12 lead   Result Value Ref Range    Ventricular Rate 93 BPM    Atrial Rate 93 BPM    WI Interval 142 ms    QRS Duration 92 ms    QT Interval 362 ms    QTC Calculation(Bazett) 450 ms    P Axis 59 degrees    R Axis 61 degrees    T Axis 51 degrees    QRS Count 15 beats    Q Onset 225 ms    P Onset 154 ms    P Offset 188 ms    T Offset 406 ms    QTC Fredericia 419 ms   Urinalysis with Reflex Microscopic   Result Value Ref Range    Color, Urine Yellow Light-Yellow, Yellow, Dark-Yellow    Appearance, Urine Clear Clear    Specific Gravity, Urine 1.024 1.005 - 1.035    pH, Urine 5.0 5.0, 5.5, 6.0, 6.5, 7.0, 7.5, 8.0    Protein, Urine NEGATIVE NEGATIVE, 10 (TRACE), 20 (TRACE) mg/dL    Glucose, Urine Normal Normal mg/dL    Blood, Urine NEGATIVE NEGATIVE    Ketones, Urine NEGATIVE NEGATIVE mg/dL    Bilirubin, Urine NEGATIVE NEGATIVE    Urobilinogen, Urine Normal Normal mg/dL    Nitrite, Urine NEGATIVE NEGATIVE    Leukocyte Esterase, Urine  NEGATIVE NEGATIVE        Assessment/Plan   Raul Jules is a 59 y.o. male with a past medical history of TBI, atopic dermatitis, asthma, actinic keratosis, COPD, and HSV keratitis presented on 10/31/2024 with rash, weakness, and bilateral vision changes, and was admitted to clinical decision unit. Dermatology was consulted for evaluation of the rash.    Differential diagnoses:   Erythroderma likely due to atopic dermatitis vs less likely dermatomyositis vs nutritional deficiency  Delusions of parasitosis    Impression:      Mr. Jules presents with diffuse erythematous scaly lichenified plaques with fissuring and scattered erosions involving his face, bilateral upper extremities, trunk, and bilateral lower extremities. 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     Erythroderma is defined as diffuse redness of the skin affecting more than 90% of the body surface. The majority of the skin surface is red, scaly, and usually very pruritic. There are a myriad of underlying causes of erythroderma including psoriasis, atopic dermatitis, mycosis fungoides/sezary syndrome, pityriasis rubra pilaris, dermatomyositis, seborrheic dermatitis, tinea corporis, scabies, and autoimmune blistering diseases. Around one-third of all erythroderma cases are idiopathic.    Mr. Jules has a history of atopic dermatitis, asthma, and allergic rhinitis. In addition, he has 4 previous biopsies showing spongiotic dermatitis. Deferred additional biopsies today as patient was seen by [facility] on 10/28/24 and had 2 biopsies. Will continue to follow biopsies, but favor erythroderma is due to underlying atopic dermatitis. Recommend obtaining ARACELY w/ JUAN, CK, and aldolase to complete initial workup for dermatomyositis as patient has been reporting weakness. Additionally, will also evaluate for possible nutritional deficiency with niacin and zinc as patient has had poor PO intake, 24 pound unintentional weight loss, and rash is slightly  photo-accentuated.      We had a long discussion with Mr. Jules about long-term treatment options to manage his atopic dermatitis. Discussed Dupixent, including the risks and benefits. Patient states that he was previously following with a dermatologist at [facility] (Dr. Ly Linn or Dr. Pb Stewart) who recommended that he avoid Dupixent. Will attempt to obtain outside records to clarify any possible contraindications for Mr. Jules.     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 He has a fixed belief that he is suffering from infestation with parasites. He states there are has parasites on his face and he can feel them working their way to the top of his skin. During the exam, he requested a black light and a magnifying glass to help identify them. He reports during his last hospitalization he saw them flying back and forth in the air.     Recommendations:  -Bacterial swab of crusted erosions/fissures obtained today to rule out impetiginization.  -Defer additional biopsies today as patient had two recent biopsies 10/28/24 at [facility]. Called [facility]. Biopsy results still pending. Will continue to follow.   -Start IV methylprednisolone 60mg daily while inpatient.   -At discharge, start prednisone with following tapering schedule:  40mg x 5 days, 30mg x 5 days, 20mg x 5 days, 10mg x 5 days, and then stop.   -START triamcinolone 0.1% cream BID x 2 weeks to affected areas on the trunk/extremities. Avoid face/groin/armpits.   -START hydrocortisone 2.5% cream BID x 2 weeks to affected radha on the face/groin/armpits.   -Please obtain additional labs: ARACELY w/ JUAN, CK, aldolase, niacin, and zinc.   -Given decreased appetite and unintentional weight loss, recommend patient complete age appropriate cancer screenings.   -Will schedule dermatology outpatient follow up upon discharge.   -Will contact [facility] to obtain all previous records to identify potential contraindication  to Dupixent.     The patient was seen and discussed with attending physician Dr. Taylor. The assessment and plan was communicated to the care team.    Thank you for the consultation and for the opportunity to contribute to the care of this patient.      Mickie Donnelly DO   PGY3, Dermatology  Epic chat (preferred)  Team pager [phone]       I saw and evaluated the patient. I personally obtained the key and critical portions of the history and physical exam or was physically present for key and critical portions performed by the resident. I reviewed the resident's documentation and discussed the patient with the resident. I agree with the resident's medical decision making as documented in the note.    Leighton Taylor MD

## 2024-11-01 NOTE — ED PROVIDER NOTES
Emergency Department Provider Note        History of Present Illness     History provided by: Patient  Limitations to History: None    HPI:  Patient is a 59-year-old male with a past medical history of neutrophilic dermatosis, COPD presenting to the ED for multiple medical complaints.  Patient states that for the past few days he has had bilateral blurriness of vision.  Patient states that he also has associated floaters that has been consistent.  Patient denies any trauma to the area.  Patient states he has a history of floaters however states that it has not been this bad before.  Patient also states myalgia and generalized weakness.  Patient denies any shortness of breath or chest pain however does state increased urinary frequency.  Patient states loss of appetite as well and lightheadedness.  Patient denies any dizziness  Physical Exam   Triage vitals:  T 36.1 °C (96.9 °F)  HR 82  /77  RR 16  O2 96 % None (Room air)    Physical Exam  Constitutional:       Appearance: Normal appearance.   HENT:      Head: Normocephalic and atraumatic.   Eyes:      Extraocular Movements: Extraocular movements intact.      Pupils: Pupils are equal, round, and reactive to light.      Comments: Erythematous conjunctival, no discharge, no pain with extraocular movement, no periorbital swelling, visual acuity 20/200 bilaterally   Cardiovascular:      Rate and Rhythm: Normal rate.   Pulmonary:      Effort: Pulmonary effort is normal.   Abdominal:      General: Abdomen is flat.      Palpations: Abdomen is soft.   Musculoskeletal:         General: Normal range of motion.      Cervical back: Normal range of motion.   Skin:     General: Skin is warm.      Findings: Erythema and rash present.      Comments: Erythematous dermatitis throughout the body, flaky dry skin   Neurological:      General: No focal deficit present.      Mental Status: He is alert and oriented to person, place, and time. Mental status is at baseline.       Motor: No weakness.      Coordination: Coordination normal.      Gait: Gait normal.          Medical Decision Making & ED Course   Medical Decision Making:  Patient is a 59-year-old male presenting to the ED for multiple medical complaints.  Patient states bilateral loss of vision stating that has been blurry for the past 3 days.  Patient has associated floaters.  Differential includes CRVO, retinal occlusion, retinal detachment, vitreous detachment, uveitis, retinitis, HSV keratitis, orbital, preseptal cellulitis.  Patient states that he has no pain in the eyes and states that the blurriness of vision is bilaterally.  Patient also states no pain with extraocular movement with no swelling around the eyes less likely orbital or preseptal cellulitis.  Patient has a herpetic lesion in the right eye which she states is chronic and is on Valtrex for.  Patient denies a curtain like sensation coming down his eyes.  Ophthalmology was consulted.  Patient also stating generalized weakness.  Patient is able to move all his extremities, no ataxia and normal gait.  Patient is at his baseline is ANO x 4.  Infectious workup obtained.  Patient also has neutrophilic dermatitis in which his skin is erythematous and flaky.  Patient has been seen many times for similar worsening skin condition and has been treated for scabies. Patient concerned for parasites physical examination did not show any parasites patient does not have eosinophilia on CBC.  Please see ED course for further details.  ----      EKG Independent Interpretation:  Normal sinus rhythm, heart rate 93, QTc 450, no STEMI        The patient was discussed with the following consultants/services: None      ED Course as of 11/05/24 1022   Fri Nov 01, 2024   0020 Chest x-ray negative for pneumonia [TS]   0053 Ophthalmology saw the patient per their recommendations patient declined staining and dilation however believe that patient having dry eyes.  Recommendation for  erythromycin cream and artificial tears [TS]   0312 Patient urinalysis negative for infection, patient was admitted to the CDU for dermatology consult regarding skin condition. [TS]      ED Course User Index  [TS] Lilly Sharif MD         Diagnoses as of 11/05/24 1022   Rash          Disposition   As a result of their workup, the patient will require admission to the hospital.  The patient was informed of his diagnosis.  The patient was given the opportunity to ask questions and I answered them. The patient agreed to be admitted to the hospital.    Procedures   Procedures    Patient seen and discussed with ED attending physician.    Lilly Sharif MD  Emergency Medicine       Lilly Sharif MD  Resident  11/05/24 1023

## 2024-11-02 ENCOUNTER — PHARMACY VISIT (OUTPATIENT)
Dept: PHARMACY | Facility: CLINIC | Age: 59
End: 2024-11-02
Payer: MEDICAID

## 2024-11-02 VITALS
HEART RATE: 68 BPM | DIASTOLIC BLOOD PRESSURE: 83 MMHG | WEIGHT: 174 LBS | SYSTOLIC BLOOD PRESSURE: 131 MMHG | BODY MASS INDEX: 25.77 KG/M2 | RESPIRATION RATE: 20 BRPM | HEIGHT: 69 IN | TEMPERATURE: 97 F | OXYGEN SATURATION: 100 %

## 2024-11-02 LAB
CK SERPL-CCNC: 159 U/L (ref 0–325)
CK SERPL-CCNC: 169 U/L (ref 0–325)
HOLD SPECIMEN: NORMAL

## 2024-11-02 PROCEDURE — 2500000001 HC RX 250 WO HCPCS SELF ADMINISTERED DRUGS (ALT 637 FOR MEDICARE OP): Mod: SE | Performed by: NURSE PRACTITIONER

## 2024-11-02 PROCEDURE — 96374 THER/PROPH/DIAG INJ IV PUSH: CPT | Mod: 59

## 2024-11-02 PROCEDURE — RXMED WILLOW AMBULATORY MEDICATION CHARGE

## 2024-11-02 PROCEDURE — G0378 HOSPITAL OBSERVATION PER HR: HCPCS

## 2024-11-02 PROCEDURE — 2500000001 HC RX 250 WO HCPCS SELF ADMINISTERED DRUGS (ALT 637 FOR MEDICARE OP): Mod: SE

## 2024-11-02 PROCEDURE — 2500000001 HC RX 250 WO HCPCS SELF ADMINISTERED DRUGS (ALT 637 FOR MEDICARE OP): Mod: SE | Performed by: PHYSICIAN ASSISTANT

## 2024-11-02 PROCEDURE — 2500000005 HC RX 250 GENERAL PHARMACY W/O HCPCS: Mod: SE

## 2024-11-02 PROCEDURE — 2500000004 HC RX 250 GENERAL PHARMACY W/ HCPCS (ALT 636 FOR OP/ED): Mod: SE | Performed by: PHYSICIAN ASSISTANT

## 2024-11-02 RX ORDER — TRIAMCINOLONE ACETONIDE 1 MG/G
1 CREAM TOPICAL 2 TIMES DAILY
Qty: 80 G | Refills: 0 | Status: SHIPPED | OUTPATIENT
Start: 2024-11-02 | End: 2024-11-16

## 2024-11-02 RX ORDER — OXYCODONE AND ACETAMINOPHEN 5; 325 MG/1; MG/1
1 TABLET ORAL ONCE
Status: COMPLETED | OUTPATIENT
Start: 2024-11-02 | End: 2024-11-02

## 2024-11-02 RX ORDER — PREDNISONE 20 MG/1
TABLET ORAL
Qty: 25 TABLET | Refills: 0 | Status: SHIPPED | OUTPATIENT
Start: 2024-11-02 | End: 2024-11-22

## 2024-11-02 RX ORDER — HYDROCORTISONE 25 MG/G
CREAM TOPICAL 2 TIMES DAILY
Qty: 30 G | Refills: 0 | Status: SHIPPED | OUTPATIENT
Start: 2024-11-02

## 2024-11-02 ASSESSMENT — PAIN SCALES - GENERAL: PAINLEVEL_OUTOF10: 10 - WORST POSSIBLE PAIN

## 2024-11-02 NOTE — DISCHARGE SUMMARY
Disposition Note  [facility] CLINICAL DECISION  Patient: Raul Jules  MRN: [id number]  : 1965  Date of Evaluation: 10/31/2024  ED Provider: DON Julien, BOO      Limitations to history: None  Independent Historian: Yes  External Records Reviewed: EMR       Subjective:    Raul Jules is a 59 y.o. male has undergone comprehensive diagnostic evaluation and therapeutic management in accordance with the CDU guidelines for rash. Based on the patient's  clinical response and diagnostic information during this period of observation, it has been determined that the patient will be discharged.     The acute evaluation included:  Orders Placed This Encounter   Procedures    Tissue/Wound Culture/Smear    XR chest 2 views    CBC with Differential    Comprehensive Metabolic Panel    Lactate    Urinalysis with Reflex Microscopic    ARACELY with Reflex to JUAN    Cardiac Enzymes - CPK    Aldolase    Niacin (vitamin B3)    Zinc    Extra Tubes    PST Top    Creatine Kinase    Adult diet Regular    Communication - Prep IV site    NIPP Communication Suspected Sepsis    Communication - Call Physician for BP    Communication - Call Physician    Vital signs    Activity (specify) No Restrictions    Vital Signs    No telemetry or cardiac monitoring required    Inpatient consult to Dermatology    ECG 12 lead    Electrocardiogram, 12-lead PRN ACS symptoms    Insert and maintain peripheral IV    Initiate Observation Send to CDU    Initiate observation status         Placed in observation at:        Past History   History reviewed. No pertinent past medical history.  History reviewed. No pertinent surgical history.  Social History     Socioeconomic History    Marital status: Single   Tobacco Use    Smoking status: Never    Smokeless tobacco: Never   Substance and Sexual Activity    Alcohol use: Never    Drug use: Defer    Sexual activity: Defer     Social Drivers of Health     Financial Resource Strain:  Patient Declined (1/19/2024)    Overall Financial Resource Strain (CARDIA)     Difficulty of Paying Living Expenses: Patient declined   Transportation Needs: Patient Declined (1/19/2024)    PRAPARE - Transportation     Lack of Transportation (Medical): Patient declined     Lack of Transportation (Non-Medical): Patient declined   Housing Stability: Patient Declined (1/19/2024)    Housing Stability Vital Sign     Unable to Pay for Housing in the Last Year: Patient declined     Number of Places Lived in the Last Year: 0     Unstable Housing in the Last Year: Patient declined         Medications/Allergies     Previous Medications    ALPRAZOLAM XR (XANAX XR) 0.5 MG 24 HR TABLET    Take 1 tablet (0.5 mg) by mouth once daily in the morning. Do not crush, chew, or split.    AMLODIPINE (NORVASC) 10 MG TABLET    Take 1 tablet (10 mg) by mouth once daily. Do not start before January 21, 2024.    CALCIUM CARBONATE-VITAMIN D3 500 MG-5 MCG (200 UNIT) TABLET    Take 1 tablet by mouth once daily.    DIPHENHYDRAMINE (BENADRYL) 25 MG CAPSULE    Take 1 capsule (25 mg) by mouth every 6 hours if needed for itching.    LIDOCAINE 4 % PATCH    Place 1 patch over 12 hours on the skin once daily. Remove & discard patch within 12 hours or as directed by MD.    LORATADINE (CLARITIN) 10 MG TABLET    Take 1 tablet (10 mg) by mouth once daily.    LOSARTAN (COZAAR) 50 MG TABLET    Take 2 tablets (100 mg) by mouth once daily.    LUBRICATING EYE DROPS OPHTHALMIC SOLUTION    Administer 2 drops into both eyes 3 times a day as needed for dry eyes.    MINERAL OIL-HYDROPHILIC PETROLATUM (AQUAPHOR) OINTMENT    Apply topically 2 times a day.    MOMETASONE-FORMOTEROL (DULERA) 200-5 MCG/ACTUATION INHALER    Inhale 2 puffs 2 times a day. Rinse mouth with water after use to reduce aftertaste and incidence of candidiasis. Do not swallow.    OXYCODONE-ACETAMINOPHEN (PERCOCET) 5-325 MG TABLET    Take 1 tablet by mouth every 8 hours if needed for severe pain (7 -  10).    PANTOPRAZOLE (PROTONIX) 40 MG EC TABLET    Take 20 mg by mouth once daily in the morning. Take before meals. Do not crush, chew, or split.    PREDNISOLONE ACETATE (PRED-FORTE) 1 % OPHTHALMIC SUSPENSION    Administer 1 drop into both eyes 4 times a day.     Allergies   Allergen Reactions    Propranolol Shortness of breath    Albuterol Palpitations and Other    Amoxicillin-Pot Clavulanate Unknown    Cat Hair Standardized Allergenic Extract Itching    Hydrocodone-Acetaminophen GI Upset and Swelling    Latex Hives    Levofloxacin Other    Lorazepam Unknown    Shrimp Other    Sulfamethoxazole-Trimethoprim GI Upset    Hydroxyzine Other         Review of Systems  All systems reviewed and otherwise negative, except as stated above in HPI.    Diagnostics reviewed by Leann Ch, APRN-CNP, DNP     Labs:  Results for orders placed or performed during the hospital encounter of 10/31/24   CBC with Differential    Collection Time: 10/31/24  8:48 PM   Result Value Ref Range    WBC 12.3 (H) 4.4 - 11.3 x10*3/uL    nRBC 0.0 0.0 - 0.0 /100 WBCs    RBC 4.44 (L) 4.50 - 5.90 x10*6/uL    Hemoglobin 13.8 13.5 - 17.5 g/dL    Hematocrit 39.7 (L) 41.0 - 52.0 %    MCV 89 80 - 100 fL    MCH 31.1 26.0 - 34.0 pg    MCHC 34.8 32.0 - 36.0 g/dL    RDW 14.1 11.5 - 14.5 %    Platelets 403 150 - 450 x10*3/uL    Neutrophils % 68.3 40.0 - 80.0 %    Immature Granulocytes %, Automated 0.7 0.0 - 0.9 %    Lymphocytes % 13.4 13.0 - 44.0 %    Monocytes % 11.3 2.0 - 10.0 %    Eosinophils % 5.6 0.0 - 6.0 %    Basophils % 0.7 0.0 - 2.0 %    Neutrophils Absolute 8.41 (H) 1.20 - 7.70 x10*3/uL    Immature Granulocytes Absolute, Automated 0.08 0.00 - 0.70 x10*3/uL    Lymphocytes Absolute 1.65 1.20 - 4.80 x10*3/uL    Monocytes Absolute 1.39 (H) 0.10 - 1.00 x10*3/uL    Eosinophils Absolute 0.69 0.00 - 0.70 x10*3/uL    Basophils Absolute 0.08 0.00 - 0.10 x10*3/uL   Comprehensive Metabolic Panel    Collection Time: 10/31/24  8:48 PM   Result Value Ref Range     Glucose 90 74 - 99 mg/dL    Sodium 139 136 - 145 mmol/L    Potassium 4.4 3.5 - 5.3 mmol/L    Chloride 103 98 - 107 mmol/L    Bicarbonate 24 21 - 32 mmol/L    Anion Gap 16 10 - 20 mmol/L    Urea Nitrogen 27 (H) 6 - 23 mg/dL    Creatinine 1.19 0.50 - 1.30 mg/dL    eGFR 69 >60 mL/min/1.73m*2    Calcium 9.6 8.6 - 10.6 mg/dL    Albumin 4.4 3.4 - 5.0 g/dL    Alkaline Phosphatase 60 33 - 136 U/L    Total Protein 7.0 6.4 - 8.2 g/dL    AST 21 9 - 39 U/L    Bilirubin, Total 0.5 0.0 - 1.2 mg/dL    ALT 25 10 - 52 U/L   Lactate    Collection Time: 10/31/24  8:48 PM   Result Value Ref Range    Lactate 2.3 (H) 0.4 - 2.0 mmol/L   ECG 12 lead    Collection Time: 10/31/24 10:29 PM   Result Value Ref Range    Ventricular Rate 93 BPM    Atrial Rate 93 BPM    DE Interval 142 ms    QRS Duration 92 ms    QT Interval 362 ms    QTC Calculation(Bazett) 450 ms    P Axis 59 degrees    R Axis 61 degrees    T Axis 51 degrees    QRS Count 15 beats    Q Onset 225 ms    P Onset 154 ms    P Offset 188 ms    T Offset 406 ms    QTC Fredericia 419 ms   Urinalysis with Reflex Microscopic    Collection Time: 11/01/24  2:10 AM   Result Value Ref Range    Color, Urine Yellow Light-Yellow, Yellow, Dark-Yellow    Appearance, Urine Clear Clear    Specific Gravity, Urine 1.024 1.005 - 1.035    pH, Urine 5.0 5.0, 5.5, 6.0, 6.5, 7.0, 7.5, 8.0    Protein, Urine NEGATIVE NEGATIVE, 10 (TRACE), 20 (TRACE) mg/dL    Glucose, Urine Normal Normal mg/dL    Blood, Urine NEGATIVE NEGATIVE    Ketones, Urine NEGATIVE NEGATIVE mg/dL    Bilirubin, Urine NEGATIVE NEGATIVE    Urobilinogen, Urine Normal Normal mg/dL    Nitrite, Urine NEGATIVE NEGATIVE    Leukocyte Esterase, Urine NEGATIVE NEGATIVE   Tissue/Wound Culture/Smear    Collection Time: 11/01/24  6:00 PM    Specimen: Skin Lesion; Tissue/Biopsy   Result Value Ref Range    Tissue/Wound Culture/Smear (2+) Few Staphylococcus aureus (A)     Gram Stain No polymorphonuclear leukocytes seen (A)     Gram Stain (1+) Rare Gram  "positive cocci (A)    Cardiac Enzymes - CPK    Collection Time: 11/01/24 10:34 PM   Result Value Ref Range    Creatine Kinase 159 0 - 325 U/L   Creatine Kinase    Collection Time: 11/01/24 10:34 PM   Result Value Ref Range    Creatine Kinase 169 0 - 325 U/L   PST Top    Collection Time: 11/02/24  1:01 AM   Result Value Ref Range    Extra Tube Hold for add-ons.      Radiographs:  XR chest 2 views   Final Result   No acute abnormality.   Signed by Boom Mullins MD              Physical Exam     Visit Vitals  /83 (BP Location: Right arm, Patient Position: Lying)   Pulse 68   Temp 36.1 °C (97 °F) (Temporal)   Resp 20   Ht 1.753 m (5' 9\")   Wt 78.9 kg (174 lb)   SpO2 100%   BMI 25.70 kg/m²   Smoking Status Never   BSA 1.96 m²       GENERAL:  The patient appears nourished and normally developed. Vital signs as documented.     HEENT:  Head normocephalic, atraumatic, EOMs intact, PERRLA, Mucous membranes moist. Nares patent without copious rhinorrhea.  No lymphadenopathy.    PULMONARY:  Lungs are clear to auscultation, without any respiratory distress. Able to speak full sentences, no accessory muscle use    CARDIAC:   Normal rate. No murmurs, rubs or gallops    ABDOMEN:  Soft, non-distended, non-tender, BS positive x 4 quadrants, No rebound or guarding, no peritoneal signs, no CVA tenderness, no masses or organomegaly    MUSCULOSKELETAL:   Able to ambulate, Non edematous, with no obvious deformities. Pulses intact distal    SKIN:     -On the face, bilateral upper extremities, trunk and groin, there are erythematous scaly patches. Lichenification of face, neck, and bilateral hands/feet.  -Scattered crusted erosions on the face and neck.   -Fissuring of the hands with lichenification.   -On the bilateral lower legs, there are erythematous papules coalescing into plaques with some central islands of sparing.  NEURO:  No obvious neurological deficits, normal sensation and strength bilaterally.  Able to follow commands, " NIH 0, CN 2-12 intact.    Psych: Appropriate mood and affect    Consultants  1) dermatology       Impression and Plan    In summary, Raul Jules has been cared for according to the standard [facility] Clinical Decision Unit observation protocol for Rash. This extended period of observation was specifically required to determine the need for hospitalization. Prior to discharge from observation, the final physical exam is documented above.     Significant events during the course of observation based on the goals of the clinical problem list include:   1) rash  The patient was seen by dermatology and final recommendations were placed. Creams and Prednisone were sent to [facility] as meds to bed.  Patient requested to speak to SW   2)     Based on the patient's condition and test results, the patient will be discharged.     Total length of observation was 35 hours. Dr. Granda  is the CDU disposition attending.      Discharge Diagnosis  Rash    Issues Requiring Follow-Up  Rash     Discharge Meds     Your medication list        START taking these medications        Instructions Last Dose Given Next Dose Due   hydrocortisone 2.5 % cream      Apply topically 2 times a day x two weeks to the affected areas (face/groin/armpits)       predniSONE 20 mg tablet  Commonly known as: Deltasone  Start taking on: November 2, 2024      Take 2 tablets (40 mg) by mouth once daily for 5 days, THEN 1.5 tablets (30 mg) once daily for 5 days, THEN 1 tablet (20 mg) once daily for 5 days, THEN 0.5 tablets (10 mg) once daily for 5 days.       triamcinolone 0.1 % cream  Commonly known as: Kenalog      Apply 1 Application topically 2 times a day for 14 days to the affected areas (trunk and extremities)              ASK your doctor about these medications        Instructions Last Dose Given Next Dose Due   ALPRAZolam XR 0.5 mg 24 hr tablet  Commonly known as: Xanax XR           amLODIPine 10 mg tablet  Commonly known  as: Norvasc      Take 1 tablet (10 mg) by mouth once daily. Do not start before January 21, 2024.       calcium carbonate-vitamin D3 500 mg-5 mcg (200 unit) tablet           diphenhydrAMINE 25 mg capsule  Commonly known as: BENADryl      Take 1 capsule (25 mg) by mouth every 6 hours if needed for itching.       Dulera 200-5 mcg/actuation inhaler  Generic drug: mometasone-formoterol           lidocaine 4 % patch      Place 1 patch over 12 hours on the skin once daily. Remove & discard patch within 12 hours or as directed by MD.       loratadine 10 mg tablet  Commonly known as: Claritin           losartan 50 mg tablet  Commonly known as: Cozaar           lubricating eye drops ophthalmic solution      Administer 2 drops into both eyes 3 times a day as needed for dry eyes.       mineral oil-hydrophilic petrolatum ointment  Commonly known as: Aquaphor      Apply topically 2 times a day.       oxyCODONE-acetaminophen 5-325 mg tablet  Commonly known as: Percocet           pantoprazole 40 mg EC tablet  Commonly known as: ProtoNix           prednisoLONE acetate 1 % ophthalmic suspension  Commonly known as: Pred-Forte                     Where to Get Your Medications        These medications were sent to [facility]  [address], [address], [place] [address]      Hours: 8AM to 6PM Mon-Fri, 8AM to 4PM Sat, 9AM to 1PM Sun Phone: [phone]   hydrocortisone 2.5 % cream  predniSONE 20 mg tablet  triamcinolone 0.1 % cream         Test Results Pending At Discharge  Pending Labs       Order Current Status    ARACELY with Reflex to JUAN In process    Aldolase In process    Niacin (vitamin B3) In process    Zinc In process    Tissue/Wound Culture/Smear Preliminary result          CDU COURSE:  This is a 59 year old male with an extensive rash was admitted to the CDU to see dermatology.   Dermatology saw him and recommendations were made and meds to bed were ordered. Patient requested to speak to  who gave him  resources.     Outpatient Follow-Up  No future appointments.      Leann Ch, APRN-CNP, DNP

## 2024-11-02 NOTE — PROGRESS NOTES
Subjective  Raul Jules is a 59 y.o. male on day 2 of admission presenting with Rash.   Serial assessments of clinical progress include:  1.) VSS  2.) Patient still reports extreme itching and weakness.  Dermatology provided recommendations, including methylprednisolone IV, additional labs, triamcinolone cream and hydrocortisone cream which was ordered.  Dermatology to reevaluate in AM.  Will continue to give Benadryl as needed  3.) Patient remained hemodynamically stable and neurologically intact on the rest of the night.      Objective  VS reviewed  Physical Exam:  Constitutional:  Appearance: Normal appearance.     HENT:   Head:  Head normocephalic, atraumatic  Nose: Nares patent.  Mouth/throat: No swelling, erythema  Mouth:  Mucous membranes are moist.  No oral lesions.  Findings: Fissuring of the lip.      Eyes: EOMs intact     Pulmonary:  Breathing pattern   Able to speak full sentences, no accessory muscle use.  Effort: Pulmonary effort is normal     Cardiovascular:  Regular rate and rhythm: Normal rate and rhythm.     Gastrointestinal: Abdomen is soft      Musculoskeletal: Symmetrical muscle bulk. No peripheral edema.  Pulses intact distal.  Able to walk.     Integumentary:   General: Warm, dry   Findings: Erythematous scaly patches to the face, bilateral upper extremities, trunk and groin.  There are scattered crusted erosions on the face and neck.  There are fissuring of the hands with magnification.  There are erythematous patches bilaterally to the lower legs.     Neurological:    General: No focal deficit present   Mental status: He is alert and oriented to person place and time.       Psychiatric:   Mood and affect: Calm and cooperative.          Relevant Results  Results for orders placed or performed during the hospital encounter of 10/31/24 (from the past 24 hours)   ECG 12 lead   Result Value Ref Range    Ventricular Rate 93 BPM    Atrial Rate 93 BPM    CT Interval 142 ms    QRS Duration 92 ms     QT Interval 362 ms    QTC Calculation(Bazett) 450 ms    P Axis 59 degrees    R Axis 61 degrees    T Axis 51 degrees    QRS Count 15 beats    Q Onset 225 ms    P Onset 154 ms    P Offset 188 ms    T Offset 406 ms    QTC Fredericia 419 ms   Urinalysis with Reflex Microscopic   Result Value Ref Range    Color, Urine Yellow Light-Yellow, Yellow, Dark-Yellow    Appearance, Urine Clear Clear    Specific Gravity, Urine 1.024 1.005 - 1.035    pH, Urine 5.0 5.0, 5.5, 6.0, 6.5, 7.0, 7.5, 8.0    Protein, Urine NEGATIVE NEGATIVE, 10 (TRACE), 20 (TRACE) mg/dL    Glucose, Urine Normal Normal mg/dL    Blood, Urine NEGATIVE NEGATIVE    Ketones, Urine NEGATIVE NEGATIVE mg/dL    Bilirubin, Urine NEGATIVE NEGATIVE    Urobilinogen, Urine Normal Normal mg/dL    Nitrite, Urine NEGATIVE NEGATIVE    Leukocyte Esterase, Urine NEGATIVE NEGATIVE       Imaging Results  ECG 12 lead    Result Date: 11/1/2024  Sinus rhythm with marked sinus arrhythmia with frequent Premature ventricular complexes Otherwise normal ECG No previous ECGs available See ED provider note for full interpretation and clinical correlation Confirmed by Lani Hardin ([id number]) on 11/1/2024 4:02:04 AM    XR chest 2 views    Result Date: 10/31/2024  STUDY: Chest Radiographs;  10/31/2024, 10:44 PM INDICATION: Evaluate for pneumonia. COMPARISON: None Available. ACCESSION NUMBER(S): [id number] ORDERING CLINICIAN: MATHIEU CHICAS TECHNIQUE:  Frontal and lateral chest. FINDINGS: CARDIOMEDIASTINAL SILHOUETTE: Cardiomediastinal silhouette is normal in size and configuration.  LUNGS: Lungs are clear.  ABDOMEN: No remarkable upper abdominal findings.  BONES: No acute osseous changes.    No acute abnormality. Signed by Boom Mullins MD      Medications:  erythromycin, 1 cm, Both Eyes, Nightly  artificial tears, 1 drop, Both Eyes, TID  methylPREDNISolone sodium succinate (PF), 60 mg, intravenous, Daily  pantoprazole, , ,   pantoprazole, 40 mg, oral, Daily before breakfast       PRN  medications: diphenhydrAMINE, pantoprazole     Assessment/Plan     Raul Jules continues to be managed in accordance with the CDU clinical guidelines for Rash. An update of their clinical problem list included:  1.) Rash  -Continue to monitor VS  - Dermatology recs:  - >Bacterial swab of crusted erosions/fissures obtained today to rule out impetiginization.  - >Defer additional biopsies today as patient had two recent biopsies 10/28/24 at [facility]. Called [facility]. Biopsy results still pending. Will continue to follow.   - >Start IV methylprednisolone 60mg daily while inpatient.   - >At discharge, start prednisone with following tapering schedule:  40mg x 5 days, 30mg x 5 days, 20mg x 5 days, 10mg x 5 days, and then stop.   - >START triamcinolone 0.1% cream BID x 2 weeks to affected areas on the trunk/extremities. Avoid face/groin/armpits.   - >START hydrocortisone 2.5% cream BID x 2 weeks to affected radha on the face/groin/armpits.   - >Please obtain additional labs: ARACELY w/ JUAN, CK, aldolase, niacin, and zinc.   - >Given decreased appetite and unintentional weight loss, recommend patient complete age appropriate cancer screenings.   - >Will schedule dermatology outpatient follow up upon discharge.   - >Will contact [facility] dermatology to obtain all previous records to identify potential contraindication to Dupixent.     2.)  Blurry vision  -Artificial tears 3 times daily  -Erythromycin ointment nightly       We will observe the patient for the following endpoints:   1.) Stable vitals   2.)  Symptomatic improvement  3.) Cleared by Dermatology    When met, appropriate disposition will be arranged.    Raul Jules has been admitted to the CDU for 18 hours. I spent 25 minutes in the professional and overall care of this patient   @OBS@    Lani Hardin, APRN-CNP  [facility]  Clinical Decision Unit  Extension [phone]

## 2024-11-02 NOTE — CONSULTS
History Of Present Illness  Raul Jules is a 59 y.o. male presenting with rash.  Please see Dermatology Consult note on 11-1-2024 for full history.    Today, the patient feels like he's doing about the same and still endorses weakness.  Discussed that he would like to see a  about being discharged to a rehab due to the weakness.      Past Medical History  He has no past medical history on file.    Surgical History  He has no past surgical history on file.     Social History  He reports that he has never smoked. He has never used smokeless tobacco. Drug use questions deferred to the physician. He reports that he does not drink alcohol.    Family History  No family history on file.     Allergies  Propranolol, Albuterol, Amoxicillin-pot clavulanate, Cat hair standardized allergenic extract, Hydrocodone-acetaminophen, Latex, Levofloxacin, Lorazepam, Shrimp, Sulfamethoxazole-trimethoprim, and Hydroxyzine    Home Medications  Prior to Admission medications    Medication Sig Start Date End Date Taking? Authorizing Provider   ALPRAZolam XR (Xanax XR) 0.5 mg 24 hr tablet Take 1 tablet (0.5 mg) by mouth once daily in the morning. Do not crush, chew, or split.    Historical Provider, MD   amLODIPine (Norvasc) 10 mg tablet Take 1 tablet (10 mg) by mouth once daily. Do not start before January 21, 2024. 1/21/24   Roger Lacey, DO   calcium carbonate-vitamin D3 500 mg-5 mcg (200 unit) tablet Take 1 tablet by mouth once daily.    Historical Provider, MD   diphenhydrAMINE (BENADryl) 25 mg capsule Take 1 capsule (25 mg) by mouth every 6 hours if needed for itching. 1/23/24   Roger Lacey, DO   lidocaine 4 % patch Place 1 patch over 12 hours on the skin once daily. Remove & discard patch within 12 hours or as directed by MD. 1/25/24   Roger Lacey, DO   loratadine (Claritin) 10 mg tablet Take 1 tablet (10 mg) by mouth once daily.    Historical Provider, MD   losartan (Cozaar) 50 mg tablet Take 2  "tablets (100 mg) by mouth once daily.    Historical Provider, MD   lubricating eye drops ophthalmic solution Administer 2 drops into both eyes 3 times a day as needed for dry eyes. 1/20/24   Roger Lacey DO   mineral oil-hydrophilic petrolatum (Aquaphor) ointment Apply topically 2 times a day. 1/20/24   Roger Lacey DO   mometasone-formoterol (Dulera) 200-5 mcg/actuation inhaler Inhale 2 puffs 2 times a day. Rinse mouth with water after use to reduce aftertaste and incidence of candidiasis. Do not swallow.    Historical Provider, MD   oxyCODONE-acetaminophen (Percocet) 5-325 mg tablet Take 1 tablet by mouth every 8 hours if needed for severe pain (7 - 10).    Historical Provider, MD   pantoprazole (ProtoNix) 40 mg EC tablet Take 20 mg by mouth once daily in the morning. Take before meals. Do not crush, chew, or split.    Historical Provider, MD   prednisoLONE acetate (Pred-Forte) 1 % ophthalmic suspension Administer 1 drop into both eyes 4 times a day.    Historical Provider, MD       Review of Systems  Constitutional:  Positive for appetite change and unexpected weight change. Negative for chills and fever.   Eyes:  Positive for redness and visual disturbance.   Respiratory:  Positive for shortness of breath.    Musculoskeletal:         Weakness   Skin:  Positive for rash.      Physical Exam  GEN: no acute distress  NEURO: moving all extremities   ENT:   - Lips: fissuring of the lips  SKIN: A limited skin exam shows:  -On the face and bilateral upper extremities are erythematous scaly patches. Lichenification of face, neck, and bilateral hands/feet.  -Scattered crusted erosions on the face and neck.   -Fissuring of the hands with lichenification.         Last Recorded Vitals  Blood pressure 120/69, pulse 70, temperature 36 °C (96.8 °F), temperature source Temporal, resp. rate 19, height 1.753 m (5' 9\"), weight 78.9 kg (174 lb), SpO2 99%.    Relevant Results        Results for orders placed or " performed during the hospital encounter of 10/31/24 (from the past 24 hours)   Tissue/Wound Culture/Smear    Specimen: Skin Lesion; Tissue/Biopsy   Result Value Ref Range    Tissue/Wound Culture/Smear (2+) Few Staphylococcus aureus (A)     Gram Stain No polymorphonuclear leukocytes seen (A)     Gram Stain (1+) Rare Gram positive cocci (A)    Cardiac Enzymes - CPK   Result Value Ref Range    Creatine Kinase 159 0 - 325 U/L   Creatine Kinase   Result Value Ref Range    Creatine Kinase 169 0 - 325 U/L   PST Top   Result Value Ref Range    Extra Tube Hold for add-ons.      Scheduled medications  erythromycin, 1 cm, Both Eyes, Nightly  hydrocortisone, , Topical, BID  artificial tears, 1 drop, Both Eyes, TID  pantoprazole, 40 mg, oral, Daily before breakfast  triamcinolone, , Topical, BID      Continuous medications     PRN medications           Assessment/Plan     Raul Jules is a 59 y.o. male with a past medical history of TBI, atopic dermatitis, asthma, actinic keratosis, COPD, and HSV keratitis presented on 10/31/2024 with rash, weakness, and bilateral vision changes, and was admitted to clinical decision unit. Dermatology was consulted for evaluation of the rash.     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 Jules presents with diffuse erythematous scaly lichenified plaques with fissuring and scattered erosions involving his face, bilateral upper extremities, trunk, and bilateral lower extremities. 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      Erythroderma is defined as diffuse redness of the skin affecting more than 90% of the body surface. The majority of the skin surface is red, scaly, and usually very pruritic. There are a myriad of underlying causes of erythroderma including psoriasis, atopic dermatitis, mycosis fungoides/sezary syndrome, pityriasis rubra pilaris,  dermatomyositis, seborrheic dermatitis, tinea corporis, scabies, and autoimmune blistering diseases. Around one-third of all erythroderma cases are idiopathic.     Mr. Jules has a history of atopic dermatitis, asthma, and allergic rhinitis. In addition, he has 4 previous biopsies showing spongiotic dermatitis. Deferred additional biopsies today as patient was seen by [facility] on 10/28/24 and had 2 biopsies. Will continue to follow biopsies, but favor erythroderma is due to underlying atopic dermatitis. Recommend obtaining ARACELY w/ JUAN, CK, and aldolase to complete initial workup for dermatomyositis as patient has been reporting weakness. Additionally, will also evaluate for possible nutritional deficiency with niacin and zinc as patient has had poor PO intake, 24 pound unintentional weight loss, and rash is slightly photo-accentuated.       We had a long discussion with Mr. Jules about long-term treatment options to manage his atopic dermatitis. Discussed Dupixent, including the risks and benefits. Patient states that he was previously following with a dermatologist at [facility] (Dr. Ly Linn or Dr. Pb Stewart) who recommended that he avoid Dupixent. Will attempt to obtain outside records to clarify any possible contraindications for Mr. Jules.      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 He has a fixed belief that he is suffering from infestation with parasites. He states there are has parasites on his face and he can feel them working their way to the top of his skin. During the exam, he requested a black light and a magnifying glass to help identify them. He reports during his last hospitalization he saw them flying back and forth in the air.      Recommendations:  -Preliminary bacterial swab of crusted erosions/fissures most likely shows skin contaminant  -Defer additional biopsies today as patient had two recent biopsies 10/28/24 at [facility]. Called [facility].  Biopsy results still pending. Will continue to follow.   -S/p IV methylprednisolone 60mg  x 2  -At discharge, start prednisone with following tapering schedule:  40mg x 5 days, 30mg x 5 days, 20mg x 5 days, 10mg x 5 days, and then stop.   -START triamcinolone 0.1% cream BID x 2 weeks to affected areas on the trunk/extremities. Avoid face/groin/armpits.   -START hydrocortisone 2.5% cream BID x 2 weeks to affected radha on the face/groin/armpits.   -CK wnl  -Additional labs pending: ARACELY w/ JUAN, aldolase, niacin, and zinc.   -Given decreased appetite and unintentional weight loss, recommend patient complete age appropriate cancer screenings.   -Will schedule dermatology outpatient follow up upon discharge.   -Will contact [facility] to obtain all previous records to identify potential contraindication to Dupixent.   -Discussed his request for a  with ED who will consult social work          Juan Ramon Montano MD  PGY-2, Dermatology    I reviewed the resident/fellow's documentation and discussed the patient with the resident/fellow. I agree with the resident/fellow's medical decision making as documented in the note.     Leighton Taylor MD

## 2024-11-03 LAB
BACTERIA SPEC CULT: ABNORMAL
GRAM STN SPEC: ABNORMAL
GRAM STN SPEC: ABNORMAL

## 2024-11-04 LAB
ALDOLASE SERPL-CCNC: 9.2 U/L (ref 1.2–7.6)
ANA SER QL HEP2 SUBST: NEGATIVE
ZINC SERPL-MCNC: 82.7 UG/DL (ref 60–120)

## 2024-11-05 RX ORDER — SULFAMETHOXAZOLE AND TRIMETHOPRIM 800; 160 MG/1; MG/1
1 TABLET ORAL 2 TIMES DAILY
Qty: 14 TABLET | Refills: 0 | Status: SHIPPED | OUTPATIENT
Start: 2024-11-05 | End: 2024-11-12

## 2024-11-05 NOTE — SIGNIFICANT EVENT
Called patient 11/5/24. Discussed that wound culture grew MRSA (S: bactrim and vancomycin, R: clindamycin, erythromycin, oxacillin, and tetracycline). Patient has bactrim listed as an allergy/intolerance (GI upset). Patient states he is not aware of this side effect and is willing to restart bactrim. Rx sent for bactrim DS BID x 7 days to patient's preferred pharmacy ([facility] in [place]).     Also called [facility] to obtain recent biopsy results and discuss potential contraindication to Dupixent. Two biopsies obtained 10/28/24 at [facility] from left superior upper back and right upper cutaneous lip demonstrated spongiotic dermatitis. This is consistent with his previous biopsies. Reinforces that erythroderma is likely due to his underlying atopic dermatitis.      Per [facility], he was never been seen by Dr. Ly Linn or Dr. Pb Stewart. Unclear who told patient that he has contraindication to Dupixent. Discussed with patient that based on his history we cannot identify any contraindication to Dupixent. Also explained that steroids can be helpful to control itch and his eczema in the short term, but need to start steroid sparing agent to avoid long term side effects of prednisone. Explained that due to the severity of his skin condition will likely need a systemic agent to help manage his atopic dermatitis. At the end of the call, patient states he would be open to Dupixent if he is able to be observed after his injection to ensure he does not have any reactions. Patient states he would like to follow up outpatient at [facility]. Sent request for follow up in 2 weeks.     Mickie Donnelly DO   PGY-3, Department of Dermatology

## 2024-11-06 LAB
NIACIN SERPL-MCNC: NORMAL NG/ML
NICOTINAMIDE SERPL-MCNC: 48 NG/ML
NICOTINURATE SERPL-MCNC: NORMAL NG/ML

## 2024-11-12 ENCOUNTER — TELEPHONE (OUTPATIENT)
Dept: DERMATOLOGY | Facility: CLINIC | Age: 59
End: 2024-11-12

## 2024-11-12 ENCOUNTER — APPOINTMENT (OUTPATIENT)
Dept: DERMATOLOGY | Facility: CLINIC | Age: 59
End: 2024-11-12
Payer: COMMERCIAL

## 2024-11-12 DIAGNOSIS — L20.89 OTHER ATOPIC DERMATITIS: Primary | ICD-10-CM

## 2024-11-12 DIAGNOSIS — Z79.899 HIGH RISK MEDICATION USE: ICD-10-CM

## 2024-11-12 PROCEDURE — 1036F TOBACCO NON-USER: CPT | Performed by: DERMATOLOGY

## 2024-11-12 PROCEDURE — 99214 OFFICE O/P EST MOD 30 MIN: CPT | Performed by: DERMATOLOGY

## 2024-11-12 RX ORDER — PREDNISONE 20 MG/1
TABLET ORAL
Qty: 67 TABLET | Refills: 0 | Status: SHIPPED | OUTPATIENT
Start: 2024-11-12

## 2024-11-12 NOTE — TELEPHONE ENCOUNTER
Pt was contacted and notified of below information. Pt state he is having issues at the moment with his pharmacy and he is headed there now to see what the issue with the prednisone prescription is.  Pt was adivsed to get labs drawn.     Message was sent to PAR for scheduling.    Chris Chavez LPN

## 2024-11-12 NOTE — PROGRESS NOTES
Subjective     Raul Jules Jr. is a 59 y.o. male who presents for the following: Rash (Pt states rash all over- pt states has been worked up and biopsied at other dermatology departments. States from his traumatic brain injury he has a hard time remembering things. States unsure of treatment that he's tried. Chaperone offered and declined.  ).     Review of Systems:  No other skin or systemic complaints other than what is documented elsewhere in the note.    The following portions of the chart were reviewed this encounter and updated as appropriate:   Tobacco  Allergies  Meds  Problems  Med Hx  Surg Hx  Fam Hx                Objective   Well appearing patient in no apparent distress; mood and affect are within normal limits.    A focused skin examination was performed. All findings within normal limits unless otherwise noted below.    Assessment/Plan   1. Other atopic dermatitis  Erythematous, eczematous patches and plaques with xerotic scale    Patient presents for additional dermatologic evaluation for a skin condition that has been ongoing since 2016. The patient has a history of a traumatic brain injury and cannot recall dates or treatments. History is gathered from the medical record. The patient was seen originally in  system in 2016 with a biopsy that returned as subacute spongiotic dermatitis. The patient did not return for follow up. The patient was then seen by Dr Bridges in April 2024, and a biopsy was performed which returned as subacute spongiotic dermatitis.  The patient did not return for follow up, but has been seeing dermatologists elsewhere. The patient was also recently hospitalized due to MSSA/MRSA infection. The patient's record indicates many medications, including terbinafine, valtrex, clobetasol, methylprednisone, doxcycyline, atarax, permethrin, triamcinolone, etc but the patient is not sure what they have taken or what they are using.  -Discussed biopsy results, which are  consistent with atopic dermatitis.  -Most recent biopsy from Dr Bridges in 2024 shows: SUBACUTE SPONGIOTIC DERMATITIS WITH EOSINOPHILS, differential contact dermatitis v nummular dermatitis v atopic dermatitis v eczematous dermatitis v id  -Labs reviewed, with negative ARACELY, negative HIV, negative stool o/p  -Discussed I recommend systemic treatment with dupixent. Patient is concerned about potential for ocular complications.   -Recommend prednisone course in the short term to control current eruption which is extensive, BSA >25% on exam today  -Patient to get labs drawn and then return to start dupixent in 2 weeks time    -Side effects of rx prednisone reviewed, including but not limited to: elevations in blood pressure, elevations in blood sugar (worsening of diabetes), risk of cataracts/glaucoma, weight gain, bone loss, joint disease/aseptic necrosis, insomnia.   -Advised the patient if taking a longer course, not to abruptly discontinue unless specifically instructed by physician.  -For longer courses, GI prophylaxis may be needed        Related Procedures  Comprehensive Metabolic Panel  Lipid Panel  CBC and Auto Differential  Hepatitis C Antibody  Hepatitis B Surface Antigen  Hepatitis B Surface Antibody  Hepatitis B Core Antibody, Total  T-Spot TB    Related Medications  predniSONE (Deltasone) 20 mg tablet  Take 4 tablets (80mg) by mouth for 7 days, 3 tabs (60mg) for 7 days, 2 tabs (40mg) for 5 days, 1 tab (20mg) for 5 days, then half tablet (10mg) daily for 5 days    2. High risk medication use    Related Procedures  Comprehensive Metabolic Panel  Lipid Panel  CBC and Auto Differential  Hepatitis C Antibody  Hepatitis B Surface Antigen  Hepatitis B Surface Antibody  Hepatitis B Core Antibody, Total  T-Spot TB        Follow up in 2 weeks for evaluation of dermatitis and further discussion on dupixent  Discussed if there are any changes or development of concerning symptoms (lesion/skin condition is changing,  bleeding, enlarging, or worsening) the patient is to contact my office. The patient verbalizes understanding.    Deepti Brown MD  11/12/2024

## 2024-11-12 NOTE — TELEPHONE ENCOUNTER
----- Message from Deepti Brown sent at 11/12/2024 12:13 PM EST -----  Please call the patient and let them know that I have reviewed their biopsies, and I recommend to proceed with dupixent. I have ordered labs. The patient needs to go to a  blood draw station to get their labs drawn. I have sent their prednisone prescription to their pharmacy. The patient needs to follow up in 2 weeks time for further discussion    Thank you!

## 2024-11-14 ENCOUNTER — TELEPHONE (OUTPATIENT)
Dept: DERMATOLOGY | Facility: CLINIC | Age: 59
End: 2024-11-14
Payer: COMMERCIAL

## 2024-11-14 NOTE — TELEPHONE ENCOUNTER
Pt contacted office and states he has an ongoing dermatitis and infections and he thinks that because of this he has pinworms.  Pt states he has done research and that his research has told him that he most likely has pinworms.  Pt states he reached out to his PCP to inquire as to who would do the testing for pinworms and he was directed to Dermatology.      Pt would like to know if Dermatology is the specialty for him to seek treatment for what he thinks is a pinworm infestation?    Thanks!    Chris Chavez LPN

## 2024-11-14 NOTE — TELEPHONE ENCOUNTER
Pt was contacted and notified that unfortunately MD has never treated pinworms and this is normally taken care of by PCP.  Pt is agreeable and states he will touch base with his PCP for further assistance.     Chris Chavez LPN

## 2024-11-27 ENCOUNTER — APPOINTMENT (OUTPATIENT)
Dept: DERMATOLOGY | Facility: CLINIC | Age: 59
End: 2024-11-27
Payer: COMMERCIAL

## 2024-11-27 DIAGNOSIS — L20.89 OTHER ATOPIC DERMATITIS: Primary | ICD-10-CM

## 2024-11-27 PROCEDURE — 99214 OFFICE O/P EST MOD 30 MIN: CPT | Performed by: DERMATOLOGY

## 2024-11-27 PROCEDURE — 1036F TOBACCO NON-USER: CPT | Performed by: DERMATOLOGY

## 2024-11-27 NOTE — PROGRESS NOTES
Subjective     Raul Jules Jr. is a 59 y.o. male who presents for the following: Dermatitis (Pt stated is still taking the oral prednisone, states has improved, states clear today. Did not get labs done. ).     Review of Systems:  No other skin or systemic complaints other than what is documented elsewhere in the note.    The following portions of the chart were reviewed this encounter and updated as appropriate:   Tobacco  Allergies  Meds  Problems  Med Hx  Surg Hx  Fam Hx                Objective   Well appearing patient in no apparent distress; mood and affect are within normal limits.    A focused skin examination was performed. All findings within normal limits unless otherwise noted below.    Assessment/Plan   1. Other atopic dermatitis  Clear today    Patient presents for follow up. Is taking prednisone with complete clearance and is very happy. Patient requests to stay on prednisone; discussed this is not a viable option as this causes issues long term.  -Discussed treatments for patient's recalcitrant condition which will recur once prednisone course is completed  -At last visit, we had discussed starting dupixent. Patient did not get their labs drawn and declines systemic treatment at this time  -Patient is wiping the body down with listerine and states this is helping. Discussed I do not recommend for the patient to do this as it will likely worsen their condition. Discussed the improvement they are experiencing is due to the oral prednisone, not the topical listerine.  -Patient wishes to try Opzelura to see if this will be effective for them  -[facility]  -Patient also requests for an ointment to be sent for fissured and hyperkeratotic finger tips and heels. Patient is unsure of the name of the ointment and will call to let me know what to send.    Prior History:  Patient presents for additional dermatologic evaluation for a skin condition that has been ongoing since 2016.  The patient has a history of a traumatic brain injury and cannot recall dates or treatments. History is gathered from the medical record. The patient was seen originally in  system in 2016 with a biopsy that returned as subacute spongiotic dermatitis. The patient did not return for follow up. The patient was then seen by Dr Bridges in April 2024, and a biopsy was performed which returned as subacute spongiotic dermatitis.  The patient did not return for follow up, but has been seeing dermatologists elsewhere. The patient was also recently hospitalized due to MSSA/MRSA infection. The patient's record indicates many medications, including terbinafine, valtrex, clobetasol, methylprednisone, doxcycyline, atarax, permethrin, triamcinolone, etc but the patient is not sure what they have taken or what they are using.  -Discussed biopsy results, which are consistent with atopic dermatitis.  -Most recent biopsy from Dr Bridges in 2024 shows: SUBACUTE SPONGIOTIC DERMATITIS WITH EOSINOPHILS, differential contact dermatitis v nummular dermatitis v atopic dermatitis v eczematous dermatitis v id  -Labs reviewed, with negative ARACELY, negative HIV, negative stool o/p  -Discussed I recommend systemic treatment with dupixent. Patient is concerned about potential for ocular complications.   -Recommend prednisone course in the short term to control current eruption which is extensive, BSA >25% on exam today  -Patient to get labs drawn and then return to start dupixent in 2 weeks time          Related Medications  predniSONE (Deltasone) 20 mg tablet  Take 4 tablets (80mg) by mouth for 7 days, 3 tabs (60mg) for 7 days, 2 tabs (40mg) for 5 days, 1 tab (20mg) for 5 days, then half tablet (10mg) daily for 5 days    ruxolitinib (Opzelura) 1.5 % cream  Apply 1 Application topically 2 times a day. Do not use longer than 8 weeks continuously. May restart for flares/recurrences.        Follow up in 3 months for atopic dermatitis  Discussed if  there are any changes or development of concerning symptoms (lesion/skin condition is changing, bleeding, enlarging, or worsening) the patient is to contact my office. The patient verbalizes understanding.    Deepti Brown MD  11/27/2024      Addendum: Insurance will not cover opzelura until 30 day trial of TCI has been tried. Rx for TCI sent for patient to use to patient's [facility]. Patient to notify me if in greater than 30 days, needs Opzelura. Nurse to notify patient.    Deepti Brown MD  12/02/24

## 2024-11-29 ENCOUNTER — SPECIALTY PHARMACY (OUTPATIENT)
Dept: PHARMACY | Facility: CLINIC | Age: 59
End: 2024-11-29

## 2024-12-02 ENCOUNTER — TELEPHONE (OUTPATIENT)
Dept: DERMATOLOGY | Facility: CLINIC | Age: 59
End: 2024-12-02
Payer: COMMERCIAL

## 2024-12-02 NOTE — TELEPHONE ENCOUNTER
----- Message from Deepti Brown sent at 12/2/2024  9:27 AM EST -----  Regarding: RE: FREEDOM thomas  Insurance will not cover opzelura until 30 day trial of TCI has been tried. Rx for TCI sent for patient to use to patient's [facility]. Patient to notify me if in greater than 30 days, needs Opzelura.     Please notify the patient, thank you  ----- Message -----  From: Meghan Chavez LPN  Sent: 12/2/2024   7:40 AM EST  To: Deepti Brown MD  Subject: FW: FREEDOM thomas                                    Please see denial and advise?    Thank you!    Chris Chavez LPN  ----- Message -----  From: Fang Fitzpatrick  Sent: 11/29/2024   1:29 PM EST  To: Peterson Paz, PharmD; Selene Mobley RN; #  Subject: FREEDOM Cash,     The insurance has denied the Opzelura for Mr. Jules for the following reason(s):     Coverage is provided when the member has a history of at least 30 days of therapy with at least one preferred medication, which include but are not limited to: Elidel and Tacrolimus (both require prior authorization). The [facility] Policy for Medical Necessity as posted on the [facility] website and [facility] Preferred Drug List criteria were reviewed and per [place] Administrative Code Rule 5160-1-01 (C) and 5160-26-03 (B), a medically necessary service must include: generally accepted standards of medical practice, be clinically appropriate in administration, treatment and outcome and be the lowest cost alternative to effectively treat the condition. Please contact your provider to assist you with other treatment options that might be covered under your benefit package, or other services that might be available through the community.    Thank you,   Fang

## 2024-12-02 NOTE — TELEPHONE ENCOUNTER
Pt was contacted and updated at this time. Pt states he would like his regular doctor to do his lab tests.  Pt to call office back to give staff fax number for PCP to have lab requisitions faxed to the office.     Chris Chavez LPN

## 2024-12-20 ENCOUNTER — TELEPHONE (OUTPATIENT)
Dept: DERMATOLOGY | Facility: CLINIC | Age: 59
End: 2024-12-20
Payer: COMMERCIAL

## 2024-12-20 NOTE — TELEPHONE ENCOUNTER
Pt contacted office and requested that labs be faxed to [facility].  Pt was able to provide fax number.  All labs were printed and faxed at this time.    Chris Chavez LPN

## 2024-12-23 ENCOUNTER — TELEPHONE (OUTPATIENT)
Dept: DERMATOLOGY | Facility: CLINIC | Age: 59
End: 2024-12-23
Payer: COMMERCIAL

## 2024-12-23 DIAGNOSIS — L20.89 OTHER ATOPIC DERMATITIS: Primary | ICD-10-CM

## 2024-12-23 RX ORDER — TACROLIMUS 1 MG/G
OINTMENT TOPICAL
Qty: 30 G | Refills: 1 | Status: SHIPPED | OUTPATIENT
Start: 2024-12-23

## 2024-12-23 NOTE — PROGRESS NOTES
Insurance mandating 30 day Trial of TCI before they will cover Opzelura. Rx for elidel/pimecrolimus was sent. Prior authorization is required for the generic medication and [facility] will not authorized, as the cover brand only. Brand is not available at this time. New rx sent for protopic to patient's [facility]. Nurse to notify patient.    Deepti Brown MD  12/23/24

## 2024-12-23 NOTE — TELEPHONE ENCOUNTER
----- Message from Deepti Brown sent at 12/23/2024  8:47 AM EST -----  Insurance mandating 30 day Trial of TCI before they will cover Opzelura. Rx for elidel/pimecrolimus was sent. Prior authorization is required for the generic medication and [facility] will not authorized, as the cover brand only. Brand is not available at this time. New rx sent for protopic to patient's [facility].     Please let the patient know, thank you

## 2024-12-23 NOTE — TELEPHONE ENCOUNTER
Pt was contacted and notified of medication change and that no PA is required and medication should be covered.     Chris Chavez LPN

## 2025-05-23 ENCOUNTER — TELEPHONE (OUTPATIENT)
Dept: DERMATOLOGY | Facility: CLINIC | Age: 60
End: 2025-05-23
Payer: COMMERCIAL

## 2025-05-23 NOTE — TELEPHONE ENCOUNTER
Pt LVM requesting a call back and stated he had questions about his medication.  Returned pts call and LVM with office phone number and requested medication name and details.  Elisabeth Prakash LPN

## 2025-07-25 DIAGNOSIS — L20.89 OTHER ATOPIC DERMATITIS: ICD-10-CM

## 2025-07-25 RX ORDER — TACROLIMUS 1 MG/G
OINTMENT TOPICAL
Qty: 30 G | Refills: 0 | Status: SHIPPED | OUTPATIENT
Start: 2025-07-25

## 2025-07-25 NOTE — TELEPHONE ENCOUNTER
Pt called requesting tacrolimus refill. RN informed pt this will be sent to Dr. Luis Manuel Brown for review. Patient verbalized understanding and has no further questions or concerns.     Enedelia MCMILLANN RN

## 2025-07-25 NOTE — TELEPHONE ENCOUNTER
RN called and notified pt tacrolimus was sent to his pharmacy. RN notified pt he will need another appt for any further refills. Pt stated he would like to make an appt. RN transferred phone call to CLAUDIA MCMILLANN RN

## 2025-07-28 ENCOUNTER — APPOINTMENT (OUTPATIENT)
Dept: DERMATOLOGY | Facility: CLINIC | Age: 60
End: 2025-07-28
Payer: COMMERCIAL

## 2025-07-28 ENCOUNTER — OFFICE VISIT (OUTPATIENT)
Dept: DERMATOLOGY | Facility: CLINIC | Age: 60
End: 2025-07-28
Payer: COMMERCIAL

## 2025-07-28 DIAGNOSIS — L20.89 OTHER ATOPIC DERMATITIS: Primary | ICD-10-CM

## 2025-07-28 PROCEDURE — 1036F TOBACCO NON-USER: CPT | Performed by: DERMATOLOGY

## 2025-07-28 PROCEDURE — 99214 OFFICE O/P EST MOD 30 MIN: CPT | Performed by: DERMATOLOGY

## 2025-07-28 RX ORDER — TIOTROPIUM BROMIDE INHALATION SPRAY 3.12 UG/1
2 SPRAY, METERED RESPIRATORY (INHALATION) DAILY
COMMUNITY

## 2025-07-28 RX ORDER — ATROPINE SULFATE 10 MG/ML
SOLUTION/ DROPS OPHTHALMIC
COMMUNITY
Start: 2025-07-14

## 2025-07-28 RX ORDER — DOXYCYCLINE 100 MG/1
CAPSULE ORAL
Qty: 14 CAPSULE | Refills: 0 | Status: SHIPPED | OUTPATIENT
Start: 2025-07-28

## 2025-07-28 RX ORDER — TRIAMCINOLONE ACETONIDE 1 MG/G
OINTMENT TOPICAL
Qty: 453.6 G | Refills: 2 | Status: SHIPPED | OUTPATIENT
Start: 2025-07-28

## 2025-07-28 RX ORDER — LEVALBUTEROL INHALATION SOLUTION 0.63 MG/3ML
SOLUTION RESPIRATORY (INHALATION)
COMMUNITY

## 2025-07-28 RX ORDER — ASPIRIN 325 MG
TABLET, DELAYED RELEASE (ENTERIC COATED) ORAL
COMMUNITY
Start: 2025-05-23

## 2025-07-28 RX ORDER — OMEPRAZOLE 20 MG/1
CAPSULE, DELAYED RELEASE ORAL
COMMUNITY
Start: 2025-07-08

## 2025-07-28 RX ORDER — PREDNISONE 20 MG/1
TABLET ORAL
Qty: 67 TABLET | Refills: 0 | Status: SHIPPED | OUTPATIENT
Start: 2025-07-28

## 2025-07-28 RX ORDER — VALACYCLOVIR HYDROCHLORIDE 1 G/1
1000 TABLET, FILM COATED ORAL DAILY
COMMUNITY

## 2025-07-28 RX ORDER — LEVALBUTEROL TARTRATE 45 UG/1
2 AEROSOL, METERED ORAL EVERY 4 HOURS PRN
COMMUNITY

## 2025-07-28 RX ORDER — TIZANIDINE 4 MG/1
1 TABLET ORAL
COMMUNITY
Start: 2025-07-08

## 2025-07-28 RX ORDER — NEOMYCIN SULFATE, POLYMYXIN B SULFATE, AND DEXAMETHASONE 3.5; 10000; 1 MG/G; [USP'U]/G; MG/G
OINTMENT OPHTHALMIC
COMMUNITY
Start: 2024-11-08

## 2025-07-28 RX ORDER — TERBINAFINE HYDROCHLORIDE 250 MG/1
250 TABLET ORAL DAILY
COMMUNITY

## 2025-07-28 RX ORDER — CETIRIZINE HYDROCHLORIDE 10 MG/1
10 TABLET ORAL
COMMUNITY
Start: 2023-08-07

## 2025-07-28 NOTE — Clinical Note
Patient has been off all therapy since November 2024 and now presents with a significant flare with significant erythroderma.   -Discussed treatment options  -Start prednisone  -Start topical steroids (triamcinolone) twice daily  -Opzelura not covered by insurance  -Patient continues to decline dupixent due to need for corneal transplant  -Patient declines NB UVB (time/transportation)  -Will send for 1 week of doxycycline for patient to start if develops infectious symptoms (history of MSSA infections)      Prior History:    Prior History:  Patient presents for additional dermatologic evaluation for a skin condition that has been ongoing since 2016. The patient has a history of a traumatic brain injury and cannot recall dates or treatments. History is gathered from the medical record. The patient was seen originally in  system in 2016 with a biopsy that returned as subacute spongiotic dermatitis. The patient did not return for follow up. The patient was then seen by Dr Bridges in April 2024, and a biopsy was performed which returned as subacute spongiotic dermatitis.  The patient did not return for follow up, but has been seeing dermatologists elsewhere. The patient was also recently hospitalized due to MSSA/MRSA infection. The patient's record indicates many medications, including terbinafine, valtrex, clobetasol, methylprednisone, doxcycyline, atarax, permethrin, triamcinolone, etc but the patient is not sure what they have taken or what they are using.  -Discussed biopsy results, which are consistent with atopic dermatitis.  -Most recent biopsy from Dr Bridges in 2024 shows: SUBACUTE SPONGIOTIC DERMATITIS WITH EOSINOPHILS, differential contact dermatitis v nummular dermatitis v atopic dermatitis v eczematous dermatitis v id  -Labs reviewed, with negative ARACELY, negative HIV, negative stool o/p  -Patient is wiping the body down with listerine and states this is helping. Discussed I do not recommend for the patient  to do this as it will likely worsen their condition. Discussed the improvement they are experiencing is due to the oral prednisone, not the topical listerine.

## 2025-07-28 NOTE — PROGRESS NOTES
"Subjective     Raul Jules Jr. is a 59 y.o. male who presents for the following: Other atopic dermatitis (Generalized- currently bilateral feet, knees, back, arms, hands, and face x 5 days ago since flare up. Pt states the skin starts to get \"wet\" and starts to peel and shed. Pt states cracking in the skin and describes a \"burning sensation\" with redness. Pt states treatment with tacrolimus with no response- burning when applied to face.).     Review of Systems:  No other skin or systemic complaints other than what is documented elsewhere in the note.    The following portions of the chart were reviewed this encounter and updated as appropriate:   Tobacco  Allergies  Meds  Problems  Med Hx  Surg Hx  Fam Hx              Objective   Well appearing patient in no apparent distress; mood and affect are within normal limits.    A focused skin examination was performed. All findings within normal limits unless otherwise noted below.    Assessment/Plan   Skin Exam  1. OTHER ATOPIC DERMATITIS  Generalized  Erythrodermic on face, arms, abdomen and lower back today  Patient has been off all therapy since November 2024 and now presents with a significant flare with significant erythroderma.   -Discussed treatment options  -Start prednisone  -Start topical steroids (triamcinolone) twice daily  -Opzelura not covered by insurance  -Patient continues to decline dupixent due to need for corneal transplant  -Patient declines NB UVB (time/transportation)  -Will send for 1 week of doxycycline for patient to start if develops infectious symptoms (history of MSSA infections)      Prior History:    Prior History:  Patient presents for additional dermatologic evaluation for a skin condition that has been ongoing since 2016. The patient has a history of a traumatic brain injury and cannot recall dates or treatments. History is gathered from the medical record. The patient was seen originally in  system in 2016 with a biopsy that " returned as subacute spongiotic dermatitis. The patient did not return for follow up. The patient was then seen by Dr Bridges in April 2024, and a biopsy was performed which returned as subacute spongiotic dermatitis.  The patient did not return for follow up, but has been seeing dermatologists elsewhere. The patient was also recently hospitalized due to MSSA/MRSA infection. The patient's record indicates many medications, including terbinafine, valtrex, clobetasol, methylprednisone, doxcycyline, atarax, permethrin, triamcinolone, etc but the patient is not sure what they have taken or what they are using.  -Discussed biopsy results, which are consistent with atopic dermatitis.  -Most recent biopsy from Dr Bridges in 2024 shows: SUBACUTE SPONGIOTIC DERMATITIS WITH EOSINOPHILS, differential contact dermatitis v nummular dermatitis v atopic dermatitis v eczematous dermatitis v id  -Labs reviewed, with negative ARACELY, negative HIV, negative stool o/p  -Patient is wiping the body down with listerine and states this is helping. Discussed I do not recommend for the patient to do this as it will likely worsen their condition. Discussed the improvement they are experiencing is due to the oral prednisone, not the topical listerine.        This Visit  - predniSONE (Deltasone) 20 mg tablet - Take 4 tablets (80mg) by mouth for 7 days, 3 tabs (60mg) for 7 days, 2 tabs (40mg) for 5 days, 1 tab (20mg) for 5 days, then half tablet (10mg) daily for 5 days  - triamcinolone (Kenalog) 0.1 % ointment - Apply to affected areas twice daily when active as needed. Use less than 14 days per month.  - doxycycline (Vibramycin) 100 mg capsule - Take one pill by mouth twice daily with food and at least 8 ounces (large glass) of water, do not lie down for 30 minutes after taking.  Existing Treatments  - pimecrolimus (Elidel) 1 % cream - Apply to affected areas twice daily when needed  - tacrolimus (Protopic) 0.1 % ointment - Apply to affected areas  once or twice daily when needed for maintenance to prevent recurrence.    Follow up in 4 weeks for atopic dermatitis  Discussed if there are any changes or development of concerning symptoms (lesion/skin condition is changing, bleeding, enlarging, or worsening) the patient is to contact my office. The patient verbalizes understanding.    Deepti Brown MD  7/28/2025

## 2025-08-28 DIAGNOSIS — L20.89 OTHER ATOPIC DERMATITIS: ICD-10-CM

## 2025-08-28 RX ORDER — PREDNISONE 20 MG/1
TABLET ORAL
Qty: 67 TABLET | Refills: 0 | OUTPATIENT
Start: 2025-08-28

## 2025-08-28 RX ORDER — TACROLIMUS 1 MG/G
OINTMENT TOPICAL
Qty: 30 G | Refills: 0 | OUTPATIENT
Start: 2025-08-28